# Patient Record
Sex: FEMALE | Race: WHITE | NOT HISPANIC OR LATINO | Employment: FULL TIME | URBAN - METROPOLITAN AREA
[De-identification: names, ages, dates, MRNs, and addresses within clinical notes are randomized per-mention and may not be internally consistent; named-entity substitution may affect disease eponyms.]

---

## 2019-09-07 ENCOUNTER — HOSPITAL ENCOUNTER (EMERGENCY)
Facility: HOSPITAL | Age: 29
Discharge: HOME | End: 2019-09-07
Attending: EMERGENCY MEDICINE
Payer: COMMERCIAL

## 2019-09-07 ENCOUNTER — APPOINTMENT (EMERGENCY)
Dept: ULTRASOUND IMAGING | Facility: HOSPITAL | Age: 29
End: 2019-09-07
Payer: COMMERCIAL

## 2019-09-07 VITALS
DIASTOLIC BLOOD PRESSURE: 65 MMHG | WEIGHT: 190 LBS | HEART RATE: 78 BPM | TEMPERATURE: 98.4 F | OXYGEN SATURATION: 98 % | BODY MASS INDEX: 31.65 KG/M2 | HEIGHT: 65 IN | SYSTOLIC BLOOD PRESSURE: 124 MMHG | RESPIRATION RATE: 18 BRPM

## 2019-09-07 DIAGNOSIS — O20.0 THREATENED MISCARRIAGE IN EARLY PREGNANCY: Primary | ICD-10-CM

## 2019-09-07 DIAGNOSIS — O20.9 VAGINAL BLEEDING AFFECTING EARLY PREGNANCY: ICD-10-CM

## 2019-09-07 LAB
ABO GROUP BLD: NORMAL
ALBUMIN SERPL BCP-MCNC: 4 G/DL (ref 3.5–5)
ALP SERPL-CCNC: 62 U/L (ref 46–116)
ALT SERPL W P-5'-P-CCNC: 16 U/L (ref 12–78)
ANION GAP SERPL CALCULATED.3IONS-SCNC: 9 MMOL/L (ref 4–13)
AST SERPL W P-5'-P-CCNC: 14 U/L (ref 5–45)
B-HCG SERPL-ACNC: 530 MIU/ML
BACTERIA UR QL AUTO: ABNORMAL /HPF
BASOPHILS # BLD AUTO: 0.05 THOUSANDS/ΜL (ref 0–0.1)
BASOPHILS NFR BLD AUTO: 1 % (ref 0–1)
BILIRUB SERPL-MCNC: 0.8 MG/DL (ref 0.2–1)
BILIRUB UR QL STRIP: NEGATIVE
BLD GP AB SCN SERPL QL: NEGATIVE
BUN SERPL-MCNC: 10 MG/DL (ref 5–25)
CALCIUM SERPL-MCNC: 9.1 MG/DL (ref 8.3–10.1)
CHLORIDE SERPL-SCNC: 105 MMOL/L (ref 100–108)
CLARITY UR: ABNORMAL
CO2 SERPL-SCNC: 28 MMOL/L (ref 21–32)
COLOR UR: ABNORMAL
CREAT SERPL-MCNC: 0.88 MG/DL (ref 0.6–1.3)
EOSINOPHIL # BLD AUTO: 0.33 THOUSAND/ΜL (ref 0–0.61)
EOSINOPHIL NFR BLD AUTO: 5 % (ref 0–6)
ERYTHROCYTE [DISTWIDTH] IN BLOOD BY AUTOMATED COUNT: 12.7 % (ref 11.6–15.1)
GFR SERPL CREATININE-BSD FRML MDRD: 88 ML/MIN/1.73SQ M
GLUCOSE SERPL-MCNC: 93 MG/DL (ref 65–140)
GLUCOSE UR STRIP-MCNC: NEGATIVE MG/DL
HCT VFR BLD AUTO: 41.8 % (ref 34.8–46.1)
HGB BLD-MCNC: 13.6 G/DL (ref 11.5–15.4)
HGB UR QL STRIP.AUTO: ABNORMAL
IMM GRANULOCYTES # BLD AUTO: 0.03 THOUSAND/UL (ref 0–0.2)
IMM GRANULOCYTES NFR BLD AUTO: 0 % (ref 0–2)
KETONES UR STRIP-MCNC: NEGATIVE MG/DL
LEUKOCYTE ESTERASE UR QL STRIP: ABNORMAL
LYMPHOCYTES # BLD AUTO: 2.78 THOUSANDS/ΜL (ref 0.6–4.47)
LYMPHOCYTES NFR BLD AUTO: 38 % (ref 14–44)
MCH RBC QN AUTO: 27.9 PG (ref 26.8–34.3)
MCHC RBC AUTO-ENTMCNC: 32.5 G/DL (ref 31.4–37.4)
MCV RBC AUTO: 86 FL (ref 82–98)
MONOCYTES # BLD AUTO: 0.33 THOUSAND/ΜL (ref 0.17–1.22)
MONOCYTES NFR BLD AUTO: 5 % (ref 4–12)
NEUTROPHILS # BLD AUTO: 3.81 THOUSANDS/ΜL (ref 1.85–7.62)
NEUTS SEG NFR BLD AUTO: 51 % (ref 43–75)
NITRITE UR QL STRIP: NEGATIVE
NON-SQ EPI CELLS URNS QL MICRO: ABNORMAL /HPF
NRBC BLD AUTO-RTO: 0 /100 WBCS
PH UR STRIP.AUTO: 6 [PH]
PLATELET # BLD AUTO: 288 THOUSANDS/UL (ref 149–390)
PMV BLD AUTO: 10.2 FL (ref 8.9–12.7)
POTASSIUM SERPL-SCNC: 3.8 MMOL/L (ref 3.5–5.3)
PROT SERPL-MCNC: 7.7 G/DL (ref 6.4–8.2)
PROT UR STRIP-MCNC: ABNORMAL MG/DL
RBC # BLD AUTO: 4.87 MILLION/UL (ref 3.81–5.12)
RBC #/AREA URNS AUTO: ABNORMAL /HPF
RH BLD: POSITIVE
SODIUM SERPL-SCNC: 142 MMOL/L (ref 136–145)
SP GR UR STRIP.AUTO: 1.02 (ref 1–1.03)
SPECIMEN EXPIRATION DATE: NORMAL
UROBILINOGEN UR QL STRIP.AUTO: 0.2 E.U./DL
WBC # BLD AUTO: 7.33 THOUSAND/UL (ref 4.31–10.16)
WBC #/AREA URNS AUTO: ABNORMAL /HPF

## 2019-09-07 PROCEDURE — 81001 URINALYSIS AUTO W/SCOPE: CPT | Performed by: PHYSICIAN ASSISTANT

## 2019-09-07 PROCEDURE — 76801 OB US < 14 WKS SINGLE FETUS: CPT

## 2019-09-07 PROCEDURE — 86900 BLOOD TYPING SEROLOGIC ABO: CPT | Performed by: PHYSICIAN ASSISTANT

## 2019-09-07 PROCEDURE — 87070 CULTURE OTHR SPECIMN AEROBIC: CPT | Performed by: PHYSICIAN ASSISTANT

## 2019-09-07 PROCEDURE — 87591 N.GONORRHOEAE DNA AMP PROB: CPT | Performed by: PHYSICIAN ASSISTANT

## 2019-09-07 PROCEDURE — 85025 COMPLETE CBC W/AUTO DIFF WBC: CPT | Performed by: PHYSICIAN ASSISTANT

## 2019-09-07 PROCEDURE — 96360 HYDRATION IV INFUSION INIT: CPT

## 2019-09-07 PROCEDURE — 99284 EMERGENCY DEPT VISIT MOD MDM: CPT | Performed by: PHYSICIAN ASSISTANT

## 2019-09-07 PROCEDURE — 87491 CHLMYD TRACH DNA AMP PROBE: CPT | Performed by: PHYSICIAN ASSISTANT

## 2019-09-07 PROCEDURE — 36415 COLL VENOUS BLD VENIPUNCTURE: CPT | Performed by: PHYSICIAN ASSISTANT

## 2019-09-07 PROCEDURE — 80053 COMPREHEN METABOLIC PANEL: CPT | Performed by: PHYSICIAN ASSISTANT

## 2019-09-07 PROCEDURE — 99284 EMERGENCY DEPT VISIT MOD MDM: CPT

## 2019-09-07 PROCEDURE — 84702 CHORIONIC GONADOTROPIN TEST: CPT | Performed by: PHYSICIAN ASSISTANT

## 2019-09-07 PROCEDURE — 87077 CULTURE AEROBIC IDENTIFY: CPT | Performed by: PHYSICIAN ASSISTANT

## 2019-09-07 PROCEDURE — 86901 BLOOD TYPING SEROLOGIC RH(D): CPT | Performed by: PHYSICIAN ASSISTANT

## 2019-09-07 PROCEDURE — 86850 RBC ANTIBODY SCREEN: CPT | Performed by: PHYSICIAN ASSISTANT

## 2019-09-07 RX ORDER — ALBUTEROL SULFATE 90 UG/1
2 AEROSOL, METERED RESPIRATORY (INHALATION) EVERY 6 HOURS PRN
COMMUNITY

## 2019-09-07 RX ORDER — HYDROXYZINE HYDROCHLORIDE 10 MG/1
10 TABLET, FILM COATED ORAL EVERY 6 HOURS PRN
COMMUNITY

## 2019-09-07 RX ADMIN — SODIUM CHLORIDE 1000 ML: 0.9 INJECTION, SOLUTION INTRAVENOUS at 18:51

## 2019-09-07 NOTE — ED PROVIDER NOTES
History  Chief Complaint   Patient presents with    Vaginal Bleeding     patient states being pregnant and trying to figure out how far along she is  spotting yesterday and bleeding more today  she states feeling cramping  Patient is a 27 y/o F  about 5 weeks pregnant that presents to the ED with vaginal bleeding and abdominal cramping that started 2 hours ago  Patient states she started with spotting yesterday, but the bleeding worsened today  She had a BhCG checked 2 days ago which was 413  She saw OB 4 days ago and was told to get serial HCGs  She thinks her blood type is O negative  History provided by:  Patient  Vaginal Bleeding   Quality:  Bright red and clots  Severity:  Moderate  Onset quality:  Sudden  Duration:  2 hours  Timing:  Constant  Progression:  Worsening  Chronicity:  New  Menstrual history:  Irregular  Possible pregnancy: yes    Context: spontaneously    Relieved by:  Nothing  Worsened by:  Nothing  Ineffective treatments:  None tried  Associated symptoms: abdominal pain    Associated symptoms: no back pain, no dizziness, no fever and no nausea    Risk factors: ovarian cysts    Risk factors: no prior miscarriage        Prior to Admission Medications   Prescriptions Last Dose Informant Patient Reported? Taking?    FLUoxetine (PROzac) 10 mg capsule  Self Yes No   Sig: Take 20 mg by mouth daily    albuterol (PROVENTIL HFA,VENTOLIN HFA) 90 mcg/act inhaler   Yes Yes   Sig: Inhale 2 puffs every 6 (six) hours as needed for wheezing   clonazePAM (KlonoPIN) 0 5 mg tablet Not Taking at Unknown time Self Yes No   Sig: Take 0 5 mg by mouth 2 (two) times a day as needed for seizures   hydrOXYzine HCL (ATARAX) 10 mg tablet   Yes Yes   Sig: Take 10 mg by mouth every 6 (six) hours as needed for itching      Facility-Administered Medications: None       Past Medical History:   Diagnosis Date    Anxiety     Ovarian cyst     left    Psychiatric disorder        Past Surgical History: Procedure Laterality Date    OVARIAN CYST REMOVAL         History reviewed  No pertinent family history  I have reviewed and agree with the history as documented  Social History     Tobacco Use    Smoking status: Never Smoker    Smokeless tobacco: Never Used   Substance Use Topics    Alcohol use: No    Drug use: Not on file        Review of Systems   Constitutional: Negative for chills and fever  HENT: Negative  Respiratory: Negative for cough and shortness of breath  Cardiovascular: Negative for chest pain and leg swelling  Gastrointestinal: Positive for abdominal pain  Negative for blood in stool, diarrhea, nausea and vomiting  Genitourinary: Positive for vaginal bleeding  Musculoskeletal: Negative for back pain  Skin: Negative for color change and rash  Neurological: Negative for dizziness, weakness and numbness  All other systems reviewed and are negative  Physical Exam  Physical Exam   Constitutional: She is oriented to person, place, and time  She appears well-developed and well-nourished  She is cooperative  She does not appear ill  No distress  HENT:   Head: Normocephalic and atraumatic  Nose: Nose normal    Mouth/Throat: Oropharynx is clear and moist    Eyes: Conjunctivae are normal    Neck: Normal range of motion  Cardiovascular: Normal rate, regular rhythm and normal heart sounds  Pulmonary/Chest: Effort normal and breath sounds normal  She has no wheezes  She has no rhonchi  She has no rales  Abdominal: Soft  Normal appearance and bowel sounds are normal  There is tenderness in the suprapubic area  There is no rigidity, no rebound and no guarding  Genitourinary: Pelvic exam was performed with patient supine  There is no rash on the right labia  There is no rash on the left labia  Cervix exhibits no discharge  There is bleeding in the vagina  Genitourinary Comments: Cervical os closed, no tissue or clots in the vagina   Mild amount of bright red blood in vagina  Musculoskeletal: Normal range of motion  She exhibits no edema  Neurological: She is alert and oriented to person, place, and time  She has normal strength  No sensory deficit  Gait normal    Skin: Skin is warm and dry  No rash noted  She is not diaphoretic  No pallor  Psychiatric: She exhibits a depressed mood (patient tearful)  Nursing note and vitals reviewed        Vital Signs  ED Triage Vitals [09/07/19 1805]   Temperature Pulse Respirations Blood Pressure SpO2   98 4 °F (36 9 °C) 95 20 141/79 95 %      Temp Source Heart Rate Source Patient Position - Orthostatic VS BP Location FiO2 (%)   Temporal Monitor Sitting Left arm --      Pain Score       5           Vitals:    09/07/19 1845 09/07/19 1930 09/07/19 1945 09/07/19 2000   BP: 106/66 118/68 112/67 124/65   Pulse: 82 80 80 87   Patient Position - Orthostatic VS: Lying Lying Lying Lying         Visual Acuity      ED Medications  Medications   sodium chloride 0 9 % bolus 1,000 mL (0 mL Intravenous Stopped 9/7/19 1951)       Diagnostic Studies  Results Reviewed     Procedure Component Value Units Date/Time    Urine Microscopic [[id number]]  (Abnormal) Collected:  09/07/19 2018    Lab Status:  Final result Specimen:  Urine, Clean Catch Updated:  09/07/19 2107     RBC, UA 30-50 /hpf      WBC, UA 2-4 /hpf      Epithelial Cells Occasional /hpf      Bacteria, UA Occasional /hpf     UA w Reflex to Microscopic w Reflex to Culture [[id number]]  (Abnormal) Collected:  09/07/19 2018    Lab Status:  Final result Specimen:  Urine, Clean Catch Updated:  09/07/19 2053     Color, UA Red     Clarity, UA Cloudy     Specific Gravity, UA 1 025     pH, UA 6 0     Leukocytes, UA Trace     Nitrite, UA Negative     Protein, UA 30 (1+) mg/dl      Glucose, UA Negative mg/dl      Ketones, UA Negative mg/dl      Urobilinogen, UA 0 2 E U /dl      Bilirubin, UA Negative     Blood, UA Large    Quantitative hCG [[id number]]  (Abnormal) Collected:  09/07/19 1826    Lab Status: Final result Specimen:  Blood from Arm, Right Updated:  09/07/19 2033     HCG, Quant 530 mIU/mL     Narrative:        Expected Ranges:     Approximate               Approximate HCG  Gestation age          Concentration ( mIU/mL)  _____________          ______________________   Va Michi                      HCG values  0 2-1                       5-50  1-2                           2-3                         100-5000  3-4                         500-51566  4-5                         1000-43848  5-6                         [id number]  6-8                         [id number]  8-12                        [id number]      Comprehensive metabolic panel [[id number]] Collected:  09/07/19 1826    Lab Status:  Final result Specimen:  Blood from Arm, Right Updated:  09/07/19 2023     Sodium 142 mmol/L      Potassium 3 8 mmol/L      Chloride 105 mmol/L      CO2 28 mmol/L      ANION GAP 9 mmol/L      BUN 10 mg/dL      Creatinine 0 88 mg/dL      Glucose 93 mg/dL      Calcium 9 1 mg/dL      AST 14 U/L      ALT 16 U/L      Alkaline Phosphatase 62 U/L      Total Protein 7 7 g/dL      Albumin 4 0 g/dL      Total Bilirubin 0 80 mg/dL      eGFR 88 ml/min/1 73sq m     Narrative:       [place] guidelines for Chronic Kidney Disease (CKD):     Stage 1 with normal or high GFR (GFR > 90 mL/min/1 73 square meters)    Stage 2 Mild CKD (GFR = 60-89 mL/min/1 73 square meters)    Stage 3A Moderate CKD (GFR = 45-59 mL/min/1 73 square meters)    Stage 3B Moderate CKD (GFR = 30-44 mL/min/1 73 square meters)    Stage 4 Severe CKD (GFR = 15-29 mL/min/1 73 square meters)    Stage 5 End Stage CKD (GFR <15 mL/min/1 73 square meters)  Note: GFR calculation is accurate only with a steady state creatinine    CBC and differential [[id number]] Collected:  09/07/19 1826    Lab Status:  Final result Specimen:  Blood from Arm, Right Updated:  09/07/19 1855     WBC 7 33 Thousand/uL      RBC 4 87 Million/uL      Hemoglobin 13 6 g/dL      Hematocrit 41 8 %      MCV 86 fL      MCH 27 9 pg      MCHC 32 5 g/dL      RDW 12 7 %      MPV 10 2 fL      Platelets 512 Thousands/uL      nRBC 0 /100 WBCs      Neutrophils Relative 51 %      Immat GRANS % 0 %      Lymphocytes Relative 38 %      Monocytes Relative 5 %      Eosinophils Relative 5 %      Basophils Relative 1 %      Neutrophils Absolute 3 81 Thousands/µL      Immature Grans Absolute 0 03 Thousand/uL      Lymphocytes Absolute 2 78 Thousands/µL      Monocytes Absolute 0 33 Thousand/µL      Eosinophils Absolute 0 33 Thousand/µL      Basophils Absolute 0 05 Thousands/µL     Genital Comprehensive Culture [[id number]] Collected:  09/07/19 1836    Lab Status: In process Specimen:  Genital from Cervix Updated:  09/07/19 1851    Chlamydia/GC amplified DNA by PCR [[id number]] Collected:  09/07/19 1836    Lab Status: In process Specimen:  Genital from Cervix Updated:  09/07/19 1851                 US OB < 14 weeks single or first gestation level 1   Final Result by Maurice Louise MD (09/07 2111)      Probable single 4 mm intrauterine gestational sac and no evidence yet of viable embryo  No adnexal masses  Recommend follow-up with repeat pelvic sonogram in approximately 2 weeks and serial beta hCG levels  Workstation performed: [id number]                    Procedures  Procedures       ED Course                               MDM  Number of Diagnoses or Management Options  Threatened miscarriage in early pregnancy: new and requires workup  Vaginal bleeding affecting early pregnancy: new and requires workup  Diagnosis management comments: Patient with vaginal bleeding, will check labs, BHCG, RH, u/s to r/o ectopic  Patient O +, no need for rhogam   D/w patient this could be early IUP vs threatened miscarriage  REturn precautions given          Amount and/or Complexity of Data Reviewed  Clinical lab tests: ordered and reviewed  Tests in the radiology section of CPT®: ordered and reviewed    Patient Progress  Patient progress: stable      Disposition  Final diagnoses:   Threatened miscarriage in early pregnancy   Vaginal bleeding affecting early pregnancy     Time reflects when diagnosis was documented in both MDM as applicable and the Disposition within this note     Time User Action Codes Description Comment    9/7/2019  9:27 PM Michelle Hand Add [O20 0] Threatened miscarriage in early pregnancy     9/7/2019  9:27 PM Michelle Hand Add [O20 8] Vaginal bleeding affecting early pregnancy       ED Disposition     ED Disposition Condition Date/Time Comment    Discharge Stable Sat Sep 7, 2019  9:27 PM [place] Severe discharge to home/self care  Follow-up Information     Follow up With Specialties Details Why Contact Info    OB/GYN  Call in 2 days For recheck           Patient's Medications   Discharge Prescriptions    No medications on file     No discharge procedures on file      ED Provider  Electronically Signed by           Rusty Parmar PA-C  09/07/19 6401

## 2019-09-08 NOTE — DISCHARGE INSTRUCTIONS
Rest, tylenol as needed for cramping  Follow up with OB/GYN doctor in 2-3 days for recheck  Return to ER if bleeding worsens or pain worsens

## 2019-09-09 ENCOUNTER — TELEPHONE (OUTPATIENT)
Dept: EMERGENCY DEPT | Facility: HOSPITAL | Age: 29
End: 2019-09-09

## 2019-09-09 DIAGNOSIS — B96.89 BACTERIAL VAGINOSIS: Primary | ICD-10-CM

## 2019-09-09 DIAGNOSIS — N76.0 BACTERIAL VAGINOSIS: Primary | ICD-10-CM

## 2019-09-09 LAB
BACTERIA GENITAL AEROBE CULT: ABNORMAL
BACTERIA GENITAL AEROBE CULT: ABNORMAL
C TRACH DNA SPEC QL NAA+PROBE: NEGATIVE
N GONORRHOEA DNA SPEC QL NAA+PROBE: NEGATIVE

## 2019-09-09 RX ORDER — METRONIDAZOLE 500 MG/1
500 TABLET ORAL EVERY 12 HOURS SCHEDULED
Qty: 14 TABLET | Refills: 0
Start: 2019-09-09 | End: 2019-09-16

## 2022-02-10 ENCOUNTER — OFFICE VISIT (OUTPATIENT)
Dept: URGENT CARE | Facility: CLINIC | Age: 32
End: 2022-02-10
Payer: COMMERCIAL

## 2022-02-10 VITALS
HEART RATE: 118 BPM | BODY MASS INDEX: 39.32 KG/M2 | RESPIRATION RATE: 16 BRPM | WEIGHT: 236 LBS | HEIGHT: 65 IN | TEMPERATURE: 100.1 F | OXYGEN SATURATION: 99 %

## 2022-02-10 DIAGNOSIS — N30.00 ACUTE CYSTITIS WITHOUT HEMATURIA: Primary | ICD-10-CM

## 2022-02-10 DIAGNOSIS — R42 VERTIGO: ICD-10-CM

## 2022-02-10 DIAGNOSIS — T50.Z95A VACCINATION SIDE EFFECTS, INITIAL ENCOUNTER: ICD-10-CM

## 2022-02-10 LAB
SL AMB  POCT GLUCOSE, UA: NEGATIVE
SL AMB LEUKOCYTE ESTERASE,UA: ABNORMAL
SL AMB POCT BILIRUBIN,UA: NEGATIVE
SL AMB POCT BLOOD,UA: NEGATIVE
SL AMB POCT CLARITY,UA: ABNORMAL
SL AMB POCT COLOR,UA: YELLOW
SL AMB POCT KETONES,UA: NEGATIVE
SL AMB POCT NITRITE,UA: NEGATIVE
SL AMB POCT PH,UA: 6.5
SL AMB POCT SPECIFIC GRAVITY,UA: 1.01
SL AMB POCT URINE PROTEIN: NEGATIVE
SL AMB POCT UROBILINOGEN: 0.2

## 2022-02-10 PROCEDURE — G0382 LEV 3 HOSP TYPE B ED VISIT: HCPCS | Performed by: PHYSICIAN ASSISTANT

## 2022-02-10 PROCEDURE — 87086 URINE CULTURE/COLONY COUNT: CPT | Performed by: PHYSICIAN ASSISTANT

## 2022-02-10 RX ORDER — MECLIZINE HCL 12.5 MG/1
12.5 TABLET ORAL 3 TIMES DAILY PRN
Qty: 30 TABLET | Refills: 0 | Status: SHIPPED | OUTPATIENT
Start: 2022-02-10

## 2022-02-10 RX ORDER — CEPHALEXIN 500 MG/1
500 CAPSULE ORAL EVERY 12 HOURS SCHEDULED
Qty: 6 CAPSULE | Refills: 0 | Status: SHIPPED | OUTPATIENT
Start: 2022-02-10 | End: 2022-02-13

## 2022-02-10 RX ORDER — LORAZEPAM 0.5 MG/1
TABLET ORAL
COMMUNITY
Start: 2021-12-07

## 2022-02-10 NOTE — PATIENT INSTRUCTIONS
Take antibiotic as prescribed  Complete full dose of antibiotics even if symptoms begin to improve or resolve  May use OTC Tylenol for fever  DRINK LOTS OF FLUIDS  Observe for signs of worsening infection including increased pain, discharge, blood in the urine, back or flank pain, fever or chills, or persistent symptoms  Your symptoms should begin to improve over the next couple days  Follow-up with your PCP in 3-5 days if symptoms worsen or do not improve  Go to ER if symptoms become severe

## 2022-02-10 NOTE — PROGRESS NOTES
[facility]        NAME: Sabas Branham is a 27 y o  female  : 1990    MRN: [id number]  DATE: February 10, 2022  TIME: 12:41 PM    Assessment and Plan   Acute cystitis without hematuria [N30 00]  1  Acute cystitis without hematuria  POCT urine dip auto non-scope    Urine culture    cephalexin (KEFLEX) 500 mg capsule   2  Vertigo  meclizine (ANTIVERT) 12 5 MG tablet   3  Vaccination side effects, initial encounter       Patient febrile with back pain/flank pain  No other UTI symptoms  Urine dip shows small leuks  Not totally convincing of UTI but will treat based on flank pain and fever with leuks and urine  Will treat with antibiotics  Urine culture sent to confirm  Suspect symptoms are related to vaccination as it is still within 24 hours since vaccine  Discussed possibly that patient may have COVID-19 or flu  She did a rapid at home which was negative  Discussed red flag symptoms with patient to seek evaluation emergency room if they occur  Advised to call Dr  Today to make appointment within to 3 days for follow-up specially should not improving  Patient verbalized understanding  Patient Instructions       Follow up with PCP in 3-5 days  Proceed to  ER if symptoms worsen  Chief Complaint     Chief Complaint   Patient presents with    Dizziness     Patient reports dizziness and radiating back pain that began last night  She received a Covid 19 booster yesterday afternoon   Back Pain         History of Present Illness       Patient is a 76-year-old female with significant past medical history of ovarian cyst and anxiety presents the office complaining of dizziness and back pain since last night  Dizziness occurs only with head movements or change in positions and resolves when she sits still  States she did feel very unbalanced earlier which caused 2 to fall to the ground but did not strike her head and denies loss of consciousness    Reports she had a fever last night but resolved this morning  She is also complaining of midback pain described as 7/10 which radiates around her flanks to her lower abdomen bilateral   She denies headaches, change in vision, congestion, rhinorrhea, sore throat, cough, chest pain, shortness of breath, numbness and tingling, dysuria, UTI symptoms, abdominal pain, or change in bowel movements  States she has been drinking plenty of fluids  Reports she got her COVID-19 booster (moderna) yesterday  Review of Systems   Review of Systems   Constitutional: Positive for chills and fever  HENT: Negative for congestion and sore throat  Eyes: Negative for photophobia and visual disturbance  Respiratory: Negative for cough and shortness of breath  Cardiovascular: Negative for chest pain and palpitations  Gastrointestinal: Negative for abdominal pain, diarrhea, nausea and vomiting  Genitourinary: Positive for flank pain  Negative for dysuria, frequency, hematuria, urgency, vaginal bleeding, vaginal discharge and vaginal pain  Musculoskeletal: Positive for myalgias  Neurological: Positive for dizziness  Negative for syncope, light-headedness and headaches  Psychiatric/Behavioral: Negative for confusion  Current Medications       Current Outpatient Medications:     albuterol (PROVENTIL HFA,VENTOLIN HFA) 90 mcg/act inhaler, Inhale 2 puffs every 6 (six) hours as needed for wheezing, Disp: , Rfl:     FLUoxetine (PROzac) 10 mg capsule, Take 20 mg by mouth daily , Disp: , Rfl:     LORazepam (ATIVAN) 0 5 mg tablet, Take half (0 25 mg) to one (0 5 mg) daily only if needed for anxiety, max dose of 0 5 mg daily  , Disp: , Rfl:     cephalexin (KEFLEX) 500 mg capsule, Take 1 capsule (500 mg total) by mouth every 12 (twelve) hours for 3 days, Disp: 6 capsule, Rfl: 0    clonazePAM (KlonoPIN) 0 5 mg tablet, Take 0 5 mg by mouth 2 (two) times a day as needed for seizures, Disp: , Rfl:     hydrOXYzine HCL (ATARAX) 10 mg tablet, Take 10 mg by mouth every 6 (six) hours as needed for itching, Disp: , Rfl:     meclizine (ANTIVERT) 12 5 MG tablet, Take 1 tablet (12 5 mg total) by mouth 3 (three) times a day as needed for dizziness, Disp: 30 tablet, Rfl: 0    Current Allergies     Allergies as of 02/10/2022 - Reviewed 02/10/2022   Allergen Reaction Noted    Bactrim [sulfamethoxazole-trimethoprim]  12/09/2016            The following portions of the patient's history were reviewed and updated as appropriate: allergies, current medications, past family history, past medical history, past social history, past surgical history and problem list      Past Medical History:   Diagnosis Date    Anxiety     Ovarian cyst     left    Psychiatric disorder        Past Surgical History:   Procedure Laterality Date    OVARIAN CYST REMOVAL         History reviewed  No pertinent family history  Medications have been verified  Objective   Pulse (!) 118   Temp 100 1 °F (37 8 °C)   Resp 16   Ht 5' 5" (1 651 m)   Wt 107 kg (236 lb)   SpO2 99%   BMI 39 27 kg/m²   No LMP recorded  Physical Exam     Physical Exam  Vitals and nursing note reviewed  Constitutional:       General: She is not in acute distress  Appearance: Normal appearance  She is well-developed  She is ill-appearing  She is not toxic-appearing  HENT:      Head: Normocephalic and atraumatic  Right Ear: Tympanic membrane, ear canal and external ear normal       Left Ear: Tympanic membrane, ear canal and external ear normal       Nose: Nose normal       Mouth/Throat:      Pharynx: Uvula midline  Eyes:      General: Lids are normal  Vision grossly intact  Extraocular Movements: Extraocular movements intact  Conjunctiva/sclera: Conjunctivae normal       Pupils: Pupils are equal, round, and reactive to light  Cardiovascular:      Rate and Rhythm: Regular rhythm  Tachycardia present  Pulses: Normal pulses  Heart sounds: Normal heart sounds   No murmur heard   No friction rub  No gallop  Pulmonary:      Effort: Pulmonary effort is normal       Breath sounds: Normal breath sounds  No wheezing, rhonchi or rales  Abdominal:      General: Bowel sounds are normal       Palpations: Abdomen is soft  Tenderness: There is no abdominal tenderness  There is right CVA tenderness and left CVA tenderness  There is no guarding  Negative signs include Aburto's sign  Musculoskeletal:         General: Normal range of motion  Cervical back: Neck supple  Lymphadenopathy:      Cervical: No cervical adenopathy  Skin:     General: Skin is warm and dry  Capillary Refill: Capillary refill takes less than 2 seconds  Neurological:      General: No focal deficit present  Mental Status: She is alert  GCS: GCS eye subscore is 4  GCS verbal subscore is 5  GCS motor subscore is 6  Cranial Nerves: Cranial nerves are intact  Sensory: Sensation is intact  Motor: Motor function is intact  Coordination: Coordination is intact  Gait: Gait is intact  Psychiatric:         Mood and Affect: Mood is anxious         Urine dip:    COLOR,UA yellow    CLARITY,UA hazy    SPECIFIC GRAVITY,UA 1 015     PH,UA 6 5    LEUKOCYTE ESTERASE,UA small    NITRITE,UA negative    GLUCOSE, UA negative    KETONES,UA negative    BILIRUBIN,UA negative    BLOOD,UA negative    POCT URINE PROTEIN negative    SL AMB POCT UROBILINOGEN 0 2

## 2022-02-12 LAB — BACTERIA UR CULT: NORMAL

## 2024-03-28 ENCOUNTER — OCCMED (OUTPATIENT)
Dept: URGENT CARE | Facility: CLINIC | Age: 34
End: 2024-03-28

## 2024-03-28 DIAGNOSIS — Z02.1 PRE-EMPLOYMENT HEALTH SCREENING EXAMINATION: Primary | ICD-10-CM

## 2024-03-28 LAB — RUBV IGG SERPL IA-ACNC: 32.6 IU/ML

## 2024-03-28 PROCEDURE — 86480 TB TEST CELL IMMUN MEASURE: CPT

## 2024-03-28 PROCEDURE — 86787 VARICELLA-ZOSTER ANTIBODY: CPT

## 2024-03-28 PROCEDURE — 86762 RUBELLA ANTIBODY: CPT

## 2024-03-28 PROCEDURE — 86765 RUBEOLA ANTIBODY: CPT

## 2024-03-28 PROCEDURE — 86735 MUMPS ANTIBODY: CPT

## 2024-03-29 LAB
GAMMA INTERFERON BACKGROUND BLD IA-ACNC: 0.07 IU/ML
M TB IFN-G BLD-IMP: NEGATIVE
M TB IFN-G CD4+ BCKGRND COR BLD-ACNC: 0.01 IU/ML
M TB IFN-G CD4+ BCKGRND COR BLD-ACNC: 0.01 IU/ML
MEV IGG SER QL IA: NORMAL
MITOGEN IGNF BCKGRD COR BLD-ACNC: 7.72 IU/ML
MUV IGG SER QL IA: NORMAL
VZV IGG SER QL IA: NORMAL

## 2024-04-29 ENCOUNTER — HOSPITAL ENCOUNTER (EMERGENCY)
Facility: HOSPITAL | Age: 34
End: 2024-04-30
Attending: EMERGENCY MEDICINE
Payer: COMMERCIAL

## 2024-04-29 DIAGNOSIS — F32.A DEPRESSION: ICD-10-CM

## 2024-04-29 DIAGNOSIS — F41.9 ANXIETY: ICD-10-CM

## 2024-04-29 DIAGNOSIS — R45.851 SUICIDAL IDEATIONS: Primary | ICD-10-CM

## 2024-04-29 PROBLEM — Z00.8 ENCOUNTER FOR PSYCHOLOGICAL EVALUATION: Status: ACTIVE | Noted: 2024-04-29

## 2024-04-29 LAB
AMPHETAMINES SERPL QL SCN: NEGATIVE
BARBITURATES UR QL: NEGATIVE
BENZODIAZ UR QL: NEGATIVE
COCAINE UR QL: NEGATIVE
ETHANOL EXG-MCNC: 0 MG/DL
EXT PREGNANCY TEST URINE: NEGATIVE
EXT. CONTROL: NORMAL
FENTANYL UR QL SCN: NEGATIVE
HYDROCODONE UR QL SCN: NEGATIVE
METHADONE UR QL: NEGATIVE
OPIATES UR QL SCN: NEGATIVE
OXYCODONE+OXYMORPHONE UR QL SCN: NEGATIVE
PCP UR QL: NEGATIVE
THC UR QL: NEGATIVE

## 2024-04-29 PROCEDURE — 82075 ASSAY OF BREATH ETHANOL: CPT

## 2024-04-29 PROCEDURE — 81025 URINE PREGNANCY TEST: CPT | Performed by: EMERGENCY MEDICINE

## 2024-04-29 PROCEDURE — 80307 DRUG TEST PRSMV CHEM ANLYZR: CPT | Performed by: EMERGENCY MEDICINE

## 2024-04-29 PROCEDURE — 99285 EMERGENCY DEPT VISIT HI MDM: CPT

## 2024-04-29 PROCEDURE — 99285 EMERGENCY DEPT VISIT HI MDM: CPT | Performed by: EMERGENCY MEDICINE

## 2024-04-29 RX ORDER — BISACODYL 10 MG
10 SUPPOSITORY, RECTAL RECTAL DAILY PRN
Status: CANCELLED | OUTPATIENT
Start: 2024-04-29

## 2024-04-29 RX ORDER — LURASIDONE HYDROCHLORIDE 80 MG/1
40 TABLET, FILM COATED ORAL
COMMUNITY
End: 2024-05-07

## 2024-04-29 RX ORDER — HALOPERIDOL 10 MG/1
5 TABLET ORAL
Status: CANCELLED | OUTPATIENT
Start: 2024-04-29

## 2024-04-29 RX ORDER — ACETAMINOPHEN 325 MG/1
650 TABLET ORAL EVERY 4 HOURS PRN
Status: CANCELLED | OUTPATIENT
Start: 2024-04-29

## 2024-04-29 RX ORDER — MAGNESIUM HYDROXIDE/ALUMINUM HYDROXICE/SIMETHICONE 120; 1200; 1200 MG/30ML; MG/30ML; MG/30ML
30 SUSPENSION ORAL EVERY 4 HOURS PRN
Status: CANCELLED | OUTPATIENT
Start: 2024-04-29

## 2024-04-29 RX ORDER — AMOXICILLIN 250 MG
1 CAPSULE ORAL DAILY PRN
Status: CANCELLED | OUTPATIENT
Start: 2024-04-29

## 2024-04-29 RX ORDER — BUSPIRONE HYDROCHLORIDE 10 MG/1
10 TABLET ORAL 2 TIMES DAILY
Status: DISCONTINUED | OUTPATIENT
Start: 2024-04-30 | End: 2024-04-30 | Stop reason: HOSPADM

## 2024-04-29 RX ORDER — ACETAMINOPHEN 325 MG/1
975 TABLET ORAL EVERY 6 HOURS PRN
Status: CANCELLED | OUTPATIENT
Start: 2024-04-29

## 2024-04-29 RX ORDER — LORAZEPAM 2 MG/ML
2 INJECTION INTRAMUSCULAR EVERY 6 HOURS PRN
Status: CANCELLED | OUTPATIENT
Start: 2024-04-29

## 2024-04-29 RX ORDER — HYDROXYZINE HYDROCHLORIDE 25 MG/1
100 TABLET, FILM COATED ORAL
Status: CANCELLED | OUTPATIENT
Start: 2024-04-29

## 2024-04-29 RX ORDER — BENZTROPINE MESYLATE 1 MG/1
1 TABLET ORAL
Status: CANCELLED | OUTPATIENT
Start: 2024-04-29

## 2024-04-29 RX ORDER — LURASIDONE HYDROCHLORIDE 40 MG/1
40 TABLET, FILM COATED ORAL
Status: DISCONTINUED | OUTPATIENT
Start: 2024-04-30 | End: 2024-04-30 | Stop reason: HOSPADM

## 2024-04-29 RX ORDER — BENZTROPINE MESYLATE 1 MG/ML
0.5 INJECTION INTRAMUSCULAR; INTRAVENOUS
Status: CANCELLED | OUTPATIENT
Start: 2024-04-29

## 2024-04-29 RX ORDER — HYDROXYZINE HYDROCHLORIDE 25 MG/1
25 TABLET, FILM COATED ORAL
Status: CANCELLED | OUTPATIENT
Start: 2024-04-29

## 2024-04-29 RX ORDER — BENZTROPINE MESYLATE 1 MG/ML
1 INJECTION INTRAMUSCULAR; INTRAVENOUS
Status: CANCELLED | OUTPATIENT
Start: 2024-04-29

## 2024-04-29 RX ORDER — HYDROXYZINE HYDROCHLORIDE 25 MG/1
50 TABLET, FILM COATED ORAL
Status: CANCELLED | OUTPATIENT
Start: 2024-04-29

## 2024-04-29 RX ORDER — HALOPERIDOL 5 MG/ML
5 INJECTION INTRAMUSCULAR
Status: CANCELLED | OUTPATIENT
Start: 2024-04-29

## 2024-04-29 RX ORDER — BUSPIRONE HYDROCHLORIDE 10 MG/1
10 TABLET ORAL 2 TIMES DAILY
COMMUNITY
Start: 2024-04-11 | End: 2024-05-07

## 2024-04-29 RX ORDER — LANOLIN ALCOHOL/MO/W.PET/CERES
3 CREAM (GRAM) TOPICAL
Status: CANCELLED | OUTPATIENT
Start: 2024-04-29

## 2024-04-29 RX ORDER — TRAZODONE HYDROCHLORIDE 50 MG/1
50 TABLET ORAL
Status: CANCELLED | OUTPATIENT
Start: 2024-04-29

## 2024-04-29 RX ORDER — HALOPERIDOL 5 MG/ML
2.5 INJECTION INTRAMUSCULAR
Status: CANCELLED | OUTPATIENT
Start: 2024-04-29

## 2024-04-29 RX ORDER — TRAZODONE HYDROCHLORIDE 50 MG/1
50 TABLET ORAL
Status: DISCONTINUED | OUTPATIENT
Start: 2024-04-29 | End: 2024-04-30 | Stop reason: HOSPADM

## 2024-04-29 RX ORDER — LORAZEPAM 1 MG/1
1 TABLET ORAL EVERY 6 HOURS PRN
Status: DISCONTINUED | OUTPATIENT
Start: 2024-04-29 | End: 2024-04-30 | Stop reason: HOSPADM

## 2024-04-29 RX ORDER — TRAZODONE HYDROCHLORIDE 50 MG/1
50 TABLET ORAL
Status: ON HOLD | COMMUNITY
Start: 2024-04-11 | End: 2024-05-07

## 2024-04-29 RX ORDER — LORAZEPAM 2 MG/ML
2 INJECTION INTRAMUSCULAR
Status: CANCELLED | OUTPATIENT
Start: 2024-04-29

## 2024-04-29 RX ORDER — LORAZEPAM 2 MG/ML
1 INJECTION INTRAMUSCULAR
Status: CANCELLED | OUTPATIENT
Start: 2024-04-29

## 2024-04-29 RX ORDER — POLYETHYLENE GLYCOL 3350 17 G/17G
17 POWDER, FOR SOLUTION ORAL DAILY PRN
Status: CANCELLED | OUTPATIENT
Start: 2024-04-29

## 2024-04-29 RX ORDER — ACETAMINOPHEN 325 MG/1
650 TABLET ORAL EVERY 6 HOURS PRN
Status: CANCELLED | OUTPATIENT
Start: 2024-04-29

## 2024-04-29 RX ORDER — HALOPERIDOL 2 MG/1
1 TABLET ORAL EVERY 6 HOURS PRN
Status: CANCELLED | OUTPATIENT
Start: 2024-04-29

## 2024-04-29 RX ORDER — DIPHENHYDRAMINE HYDROCHLORIDE 50 MG/ML
50 INJECTION INTRAMUSCULAR; INTRAVENOUS EVERY 6 HOURS PRN
Status: CANCELLED | OUTPATIENT
Start: 2024-04-29

## 2024-04-29 RX ADMIN — LORAZEPAM 1 MG: 1 TABLET ORAL at 22:05

## 2024-04-29 NOTE — ED PROVIDER NOTES
"History  Chief Complaint   Patient presents with    Psychiatric Evaluation     Pt to ED for mental health eval. + SI, \"wanted to end it\" but would not actually act on her plan. States she thought about standing in tub with hair dryer or cutting her wrist.      History from patient and medical records and , past medical history anxiety presents with concern for exacerbation today.  Patient states that she has had anxiety for few years she is on antidepressant she frequently has suicidal ideation.  Today she actually had a new thought of cutting her wrist she had a knife that she would use.  Earlier today at work she felt anxious and like a panic attack.  She was driving and did not feel safe driving she called out sick and went home but told her  that it was a panic attack.  She did take Ativan which she has been taking more frequently it has helped.  She is agreeable to inpatient psychiatric care at this point.        Prior to Admission Medications   Prescriptions Last Dose Informant Patient Reported? Taking?   LORazepam (ATIVAN) 0.5 mg tablet   Yes No   Sig: Take half (0.25 mg) to one (0.5 mg) daily only if needed for anxiety, max dose of 0.5 mg daily.   busPIRone (BUSPAR) 10 mg tablet   Yes Yes   Sig: Take 10 mg by mouth 2 (two) times a day   lurasidone (LATUDA) 80 mg tablet   Yes No   Sig: Take 40 mg by mouth daily with dinner   traZODone (DESYREL) 50 mg tablet   Yes Yes   Sig: Take 50 mg by mouth daily at bedtime      Facility-Administered Medications: None       Past Medical History:   Diagnosis Date    Anxiety     Ovarian cyst     left    Psychiatric disorder        Past Surgical History:   Procedure Laterality Date    OVARIAN CYST REMOVAL         History reviewed. No pertinent family history.  I have reviewed and agree with the history as documented.    E-Cigarette/Vaping     E-Cigarette/Vaping Substances     Social History     Tobacco Use    Smoking status: Never    Smokeless tobacco: Never "   Substance Use Topics    Alcohol use: No       Review of Systems   Constitutional:  Negative for chills and fever.   HENT:  Negative for ear pain and sore throat.    Eyes:  Negative for pain and visual disturbance.   Respiratory:  Negative for cough and shortness of breath.    Cardiovascular:  Negative for chest pain and palpitations.   Gastrointestinal:  Negative for abdominal pain and vomiting.   Genitourinary:  Negative for dysuria and hematuria.   Musculoskeletal:  Negative for arthralgias and back pain.   Skin:  Negative for color change and rash.   Neurological:  Negative for seizures and syncope.   Psychiatric/Behavioral:  Positive for suicidal ideas. The patient is nervous/anxious.    All other systems reviewed and are negative.      Physical Exam  Physical Exam  Vitals and nursing note reviewed.   Constitutional:       General: She is not in acute distress.     Appearance: She is well-developed.   HENT:      Head: Normocephalic and atraumatic.   Eyes:      Conjunctiva/sclera: Conjunctivae normal.   Cardiovascular:      Rate and Rhythm: Normal rate and regular rhythm.      Heart sounds: No murmur heard.  Pulmonary:      Effort: Pulmonary effort is normal. No respiratory distress.      Breath sounds: Normal breath sounds.   Abdominal:      Palpations: Abdomen is soft.      Tenderness: There is no abdominal tenderness.   Musculoskeletal:         General: No swelling.      Cervical back: Neck supple.   Skin:     General: Skin is warm and dry.      Capillary Refill: Capillary refill takes less than 2 seconds.   Neurological:      Mental Status: She is alert and oriented to person, place, and time.   Psychiatric:         Attention and Perception: Attention normal.         Mood and Affect: Mood is anxious and depressed. Affect is tearful.         Speech: Speech normal.         Behavior: Behavior is cooperative.         Thought Content: Thought content includes suicidal ideation. Thought content includes suicidal  plan.         Cognition and Memory: Cognition normal.         Judgment: Judgment normal.         Vital Signs  ED Triage Vitals [04/29/24 1831]   Temperature Pulse Respirations Blood Pressure SpO2   98.4 °F (36.9 °C) 98 18 (!) 150/107 99 %      Temp src Heart Rate Source Patient Position - Orthostatic VS BP Location FiO2 (%)   -- Monitor Sitting Left arm --      Pain Score       No Pain           Vitals:    04/29/24 1831 04/29/24 2113   BP: (!) 150/107 138/85   Pulse: 98 83   Patient Position - Orthostatic VS: Sitting Sitting         Visual Acuity      ED Medications  Medications   LORazepam (ATIVAN) tablet 1 mg (1 mg Oral Given 4/29/24 2205)   busPIRone (BUSPAR) tablet 10 mg (has no administration in time range)   lurasidone (LATUDA) tablet 40 mg (has no administration in time range)   traZODone (DESYREL) tablet 50 mg (has no administration in time range)       Diagnostic Studies  Results Reviewed       Procedure Component Value Units Date/Time    POCT pregnancy, urine [[id number]]  (Normal) Resulted: 04/29/24 2101    Lab Status: Final result Specimen: Urine Updated: 04/29/24 2101     EXT Preg Test, Ur Negative     Control Valid    Rapid drug screen, urine [[id number]]  (Normal) Collected: 04/29/24 2029    Lab Status: Final result Specimen: Urine, Clean Catch Updated: 04/29/24 2045     Amph/Meth UR Negative     Barbiturate Ur Negative     Benzodiazepine Urine Negative     Cocaine Urine Negative     Methadone Urine Negative     Opiate Urine Negative     PCP Ur Negative     THC Urine Negative     Oxycodone Urine Negative     Fentanyl Urine Negative     HYDROCODONE URINE Negative    Narrative:      FOR MEDICAL PURPOSES ONLY.   IF CONFIRMATION NEEDED PLEASE CONTACT THE LAB WITHIN 5 DAYS.    Drug Screen Cutoff Levels:  AMPHETAMINE/METHAMPHETAMINES  1000 ng/mL  BARBITURATES     200 ng/mL  BENZODIAZEPINES     200 ng/mL  COCAINE      300 ng/mL  METHADONE      300 ng/mL  OPIATES      300 ng/mL  PHENCYCLIDINE     25  ng/mL  THC       50 ng/mL  OXYCODONE      100 ng/mL  FENTANYL      5 ng/mL  HYDROCODONE     300 ng/mL    POCT alcohol breath test [[id number]]  (Normal) Resulted: 04/29/24 1843    Lab Status: Final result Updated: 04/29/24 1844     EXTBreath Alcohol 0.00                   No orders to display              Procedures  Procedures         ED Course  ED Course as of 04/30/24 0051   Tue Apr 30, 2024   0028 S/o to REGINA Lugo SI with plan, 201, [place] tomorrow, forms completed                               SBIRT 22yo+      Flowsheet Row Most Recent Value   Initial Alcohol Screen: US AUDIT-C     1. How often do you have a drink containing alcohol? 0 Filed at: 04/29/2024 1832   2. How many drinks containing alcohol do you have on a typical day you are drinking?  0 Filed at: 04/29/2024 1832   3a. Male UNDER 65: How often do you have five or more drinks on one occasion? 0 Filed at: 04/29/2024 1832   3b. FEMALE Any Age, or MALE 65+: How often do you have 4 or more drinks on one occassion? 0 Filed at: 04/29/2024 1832   Audit-C Score 0 Filed at: 04/29/2024 1832   KATY: How many times in the past year have you...    Used an illegal drug or used a prescription medication for non-medical reasons? Never Filed at: 04/29/2024 1832                      Medical Decision Making  Differential includes exacerbation of chronic depression, bipolar, posttraumatic stress, acute suicidal ideation with plan of cutting herself does not feel safe at home or on her own.  Signed a 201 medically cleared    Amount and/or Complexity of Data Reviewed  Labs: ordered.    Risk  Prescription drug management.  Decision regarding hospitalization.             Disposition  Final diagnoses:   Suicidal ideations   Depression   Anxiety     Time reflects when diagnosis was documented in both MDM as applicable and the Disposition within this note       Time User Action Codes Description Comment    4/29/2024  7:40 PM Basim Bautista Add [R45.851] Suicidal ideations      4/29/2024  7:40 PM Basim Bautista [F32.A] Depression     4/29/2024  7:40 PM Basim Bautista [F41.9] Anxiety           ED Disposition       ED Disposition   Transfer to Behavioral Health    Condition   --    Date/Time   Mon Apr 29, 2024 1910    Comment   Tiffany Valle should be transferred out and has been medically cleared.               MD Documentation      Flowsheet Row Most Recent Value   Patient Condition The patient has been stabilized such that within reasonable medical probability, no material deterioration of the patient condition or the condition of the unborn child(ever) is likely to result from the transfer   Reason for Transfer Level of Care needed not available at this facility  [inpatient behavioral health]   Risks of Transfer Potential for delay in receiving treatment, Potential deterioration of medical condition, Increased discomfort during transfer, Possible worsening of condition or death during transfer   Accepting Physician Dr Hall   Accepting Facility Name, [address] [place]    (Name & Tel number) [facility]   Sending MD Dr Basim Bautista, DO   Provider Certification General risk, such as traffic hazards, adverse weather conditions, rough terrain or turbulence, possible failure of equipment (including vehicle or aircraft), or consequences of actions of persons outside the control of the transport personnel, Unanticipated needs of medical equipment and personnel during transport, Risk of worsening condition, The possibility of a transport vehicle being unavailable, Consent was not obtained as patient is committed to psychiatric facility and transfer is mandated, The patient is stable for psychiatric transfer because they are medically stable, and is protected from harming him/herself or others during transport          RN Documentation      Flowsheet Row Most Recent Value   Accepting Facility Name, [facility]  [place]    (Name & Tel number) [facility]          Follow-up Information    None         Patient's Medications   Discharge Prescriptions    No medications on file       No discharge procedures on file.    PDMP Review       None            ED Provider  Electronically Signed by             Basim Bautista DO  04/30/24 0051

## 2024-04-29 NOTE — ED NOTES
201 was signed by the patient and the Provider.  Rights and Restrictions where explained.  Pt would like to be referred to [facility] or [facility].
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
## 2024-04-30 ENCOUNTER — HOSPITAL ENCOUNTER (INPATIENT)
Facility: HOSPITAL | Age: 34
LOS: 7 days | Discharge: HOME | DRG: 885 | End: 2024-05-07
Attending: STUDENT IN AN ORGANIZED HEALTH CARE EDUCATION/TRAINING PROGRAM | Admitting: PSYCHIATRY & NEUROLOGY
Payer: COMMERCIAL

## 2024-04-30 VITALS
RESPIRATION RATE: 18 BRPM | HEIGHT: 65 IN | DIASTOLIC BLOOD PRESSURE: 82 MMHG | WEIGHT: 236 LBS | SYSTOLIC BLOOD PRESSURE: 134 MMHG | BODY MASS INDEX: 39.32 KG/M2 | OXYGEN SATURATION: 97 % | TEMPERATURE: 98.4 F | HEART RATE: 90 BPM

## 2024-04-30 DIAGNOSIS — E55.9 VITAMIN D DEFICIENCY: ICD-10-CM

## 2024-04-30 DIAGNOSIS — J45.990 MILD EXERCISE-INDUCED ASTHMA: ICD-10-CM

## 2024-04-30 DIAGNOSIS — E53.8 VITAMIN B12 DEFICIENCY: ICD-10-CM

## 2024-04-30 DIAGNOSIS — G47.09 OTHER INSOMNIA: ICD-10-CM

## 2024-04-30 DIAGNOSIS — R45.851 SUICIDAL IDEATIONS: ICD-10-CM

## 2024-04-30 DIAGNOSIS — F41.1 GAD (GENERALIZED ANXIETY DISORDER): Chronic | ICD-10-CM

## 2024-04-30 DIAGNOSIS — F31.81 BIPOLAR 2 DISORDER, MAJOR DEPRESSIVE EPISODE (HCC): Primary | Chronic | ICD-10-CM

## 2024-04-30 PROBLEM — E66.9 OBESITY (BMI 35.0-39.9 WITHOUT COMORBIDITY): Status: ACTIVE | Noted: 2024-04-30

## 2024-04-30 LAB
25(OH)D3 SERPL-MCNC: 19.9 NG/ML (ref 30–100)
BACTERIA UR QL AUTO: ABNORMAL /HPF
BASOPHILS # BLD AUTO: 0.06 THOUSANDS/ÂΜL (ref 0–0.1)
BASOPHILS NFR BLD AUTO: 1 % (ref 0–1)
BILIRUB UR QL STRIP: NEGATIVE
CHOLEST SERPL-MCNC: 178 MG/DL
CLARITY UR: CLEAR
COLOR UR: ABNORMAL
EOSINOPHIL # BLD AUTO: 0.46 THOUSAND/ÂΜL (ref 0–0.61)
EOSINOPHIL NFR BLD AUTO: 6 % (ref 0–6)
ERYTHROCYTE [DISTWIDTH] IN BLOOD BY AUTOMATED COUNT: 12.9 % (ref 11.6–15.1)
EST. AVERAGE GLUCOSE BLD GHB EST-MCNC: 105 MG/DL
FOLATE SERPL-MCNC: 9.3 NG/ML
GLUCOSE UR STRIP-MCNC: NEGATIVE MG/DL
HBA1C MFR BLD: 5.3 %
HCT VFR BLD AUTO: 43.6 % (ref 34.8–46.1)
HDLC SERPL-MCNC: 43 MG/DL
HGB BLD-MCNC: 13.8 G/DL (ref 11.5–15.4)
HGB UR QL STRIP.AUTO: 10
IMM GRANULOCYTES # BLD AUTO: 0.01 THOUSAND/UL (ref 0–0.2)
IMM GRANULOCYTES NFR BLD AUTO: 0 % (ref 0–2)
KETONES UR STRIP-MCNC: NEGATIVE MG/DL
LDLC SERPL CALC-MCNC: 105 MG/DL (ref 0–100)
LEUKOCYTE ESTERASE UR QL STRIP: 100
LYMPHOCYTES # BLD AUTO: 3.1 THOUSANDS/ÂΜL (ref 0.6–4.47)
LYMPHOCYTES NFR BLD AUTO: 40 % (ref 14–44)
MCH RBC QN AUTO: 27.9 PG (ref 26.8–34.3)
MCHC RBC AUTO-ENTMCNC: 31.7 G/DL (ref 31.4–37.4)
MCV RBC AUTO: 88 FL (ref 82–98)
MONOCYTES # BLD AUTO: 0.37 THOUSAND/ÂΜL (ref 0.17–1.22)
MONOCYTES NFR BLD AUTO: 5 % (ref 4–12)
NEUTROPHILS # BLD AUTO: 3.74 THOUSANDS/ÂΜL (ref 1.85–7.62)
NEUTS SEG NFR BLD AUTO: 48 % (ref 43–75)
NITRITE UR QL STRIP: NEGATIVE
NON-SQ EPI CELLS URNS QL MICRO: ABNORMAL /HPF
NONHDLC SERPL-MCNC: 135 MG/DL
NRBC BLD AUTO-RTO: 0 /100 WBCS
PH UR STRIP.AUTO: 5 [PH]
PLATELET # BLD AUTO: 316 THOUSANDS/UL (ref 149–390)
PMV BLD AUTO: 9.7 FL (ref 8.9–12.7)
PROT UR STRIP-MCNC: ABNORMAL MG/DL
RBC # BLD AUTO: 4.95 MILLION/UL (ref 3.81–5.12)
RBC #/AREA URNS AUTO: ABNORMAL /HPF
SP GR UR STRIP.AUTO: 1.02 (ref 1–1.04)
TRIGL SERPL-MCNC: 152 MG/DL
TSH SERPL DL<=0.05 MIU/L-ACNC: 1.18 UIU/ML (ref 0.45–4.5)
UROBILINOGEN UA: NEGATIVE MG/DL
VIT B12 SERPL-MCNC: 295 PG/ML (ref 180–914)
WBC # BLD AUTO: 7.74 THOUSAND/UL (ref 4.31–10.16)
WBC #/AREA URNS AUTO: ABNORMAL /HPF

## 2024-04-30 PROCEDURE — 84443 ASSAY THYROID STIM HORMONE: CPT | Performed by: PSYCHIATRY & NEUROLOGY

## 2024-04-30 PROCEDURE — 84703 CHORIONIC GONADOTROPIN ASSAY: CPT | Performed by: PSYCHIATRY & NEUROLOGY

## 2024-04-30 PROCEDURE — 82306 VITAMIN D 25 HYDROXY: CPT | Performed by: PSYCHIATRY & NEUROLOGY

## 2024-04-30 PROCEDURE — 81001 URINALYSIS AUTO W/SCOPE: CPT | Performed by: PSYCHIATRY & NEUROLOGY

## 2024-04-30 PROCEDURE — 80061 LIPID PANEL: CPT | Performed by: PSYCHIATRY & NEUROLOGY

## 2024-04-30 PROCEDURE — 83036 HEMOGLOBIN GLYCOSYLATED A1C: CPT | Performed by: NURSE PRACTITIONER

## 2024-04-30 PROCEDURE — 85025 COMPLETE CBC W/AUTO DIFF WBC: CPT | Performed by: PSYCHIATRY & NEUROLOGY

## 2024-04-30 PROCEDURE — 82746 ASSAY OF FOLIC ACID SERUM: CPT | Performed by: PSYCHIATRY & NEUROLOGY

## 2024-04-30 PROCEDURE — 99253 IP/OBS CNSLTJ NEW/EST LOW 45: CPT | Performed by: NURSE PRACTITIONER

## 2024-04-30 PROCEDURE — 82607 VITAMIN B-12: CPT | Performed by: PSYCHIATRY & NEUROLOGY

## 2024-04-30 PROCEDURE — 80053 COMPREHEN METABOLIC PANEL: CPT | Performed by: PSYCHIATRY & NEUROLOGY

## 2024-04-30 PROCEDURE — 93005 ELECTROCARDIOGRAM TRACING: CPT

## 2024-04-30 RX ORDER — HALOPERIDOL 5 MG/ML
2.5 INJECTION INTRAMUSCULAR
Status: DISCONTINUED | OUTPATIENT
Start: 2024-04-30 | End: 2024-05-07 | Stop reason: HOSPADM

## 2024-04-30 RX ORDER — HYDROXYZINE HYDROCHLORIDE 25 MG/1
25 TABLET, FILM COATED ORAL
Status: DISCONTINUED | OUTPATIENT
Start: 2024-04-30 | End: 2024-05-07 | Stop reason: HOSPADM

## 2024-04-30 RX ORDER — BENZTROPINE MESYLATE 1 MG/ML
0.5 INJECTION INTRAMUSCULAR; INTRAVENOUS
Status: DISCONTINUED | OUTPATIENT
Start: 2024-04-30 | End: 2024-05-07 | Stop reason: HOSPADM

## 2024-04-30 RX ORDER — LURASIDONE HYDROCHLORIDE 40 MG/1
40 TABLET, FILM COATED ORAL
Status: DISCONTINUED | OUTPATIENT
Start: 2024-04-30 | End: 2024-05-01

## 2024-04-30 RX ORDER — ACETAMINOPHEN 325 MG/1
650 TABLET ORAL EVERY 4 HOURS PRN
Status: DISCONTINUED | OUTPATIENT
Start: 2024-04-30 | End: 2024-05-07 | Stop reason: HOSPADM

## 2024-04-30 RX ORDER — TRAZODONE HYDROCHLORIDE 50 MG/1
50 TABLET ORAL
Status: DISCONTINUED | OUTPATIENT
Start: 2024-04-30 | End: 2024-05-07 | Stop reason: HOSPADM

## 2024-04-30 RX ORDER — LORAZEPAM 2 MG/ML
1 INJECTION INTRAMUSCULAR
Status: DISCONTINUED | OUTPATIENT
Start: 2024-04-30 | End: 2024-05-07 | Stop reason: HOSPADM

## 2024-04-30 RX ORDER — ACETAMINOPHEN 325 MG/1
650 TABLET ORAL EVERY 6 HOURS PRN
Status: DISCONTINUED | OUTPATIENT
Start: 2024-04-30 | End: 2024-05-07 | Stop reason: HOSPADM

## 2024-04-30 RX ORDER — MAGNESIUM HYDROXIDE/ALUMINUM HYDROXICE/SIMETHICONE 120; 1200; 1200 MG/30ML; MG/30ML; MG/30ML
30 SUSPENSION ORAL EVERY 4 HOURS PRN
Status: DISCONTINUED | OUTPATIENT
Start: 2024-04-30 | End: 2024-05-07 | Stop reason: HOSPADM

## 2024-04-30 RX ORDER — ALBUTEROL SULFATE 90 UG/1
2 AEROSOL, METERED RESPIRATORY (INHALATION) EVERY 4 HOURS PRN
Status: DISCONTINUED | OUTPATIENT
Start: 2024-04-30 | End: 2024-05-07 | Stop reason: HOSPADM

## 2024-04-30 RX ORDER — ACETAMINOPHEN 325 MG/1
975 TABLET ORAL EVERY 6 HOURS PRN
Status: DISCONTINUED | OUTPATIENT
Start: 2024-04-30 | End: 2024-05-07 | Stop reason: HOSPADM

## 2024-04-30 RX ORDER — HYDROXYZINE 50 MG/1
100 TABLET, FILM COATED ORAL
Status: DISCONTINUED | OUTPATIENT
Start: 2024-04-30 | End: 2024-05-07 | Stop reason: HOSPADM

## 2024-04-30 RX ORDER — BUSPIRONE HYDROCHLORIDE 10 MG/1
10 TABLET ORAL 2 TIMES DAILY
Status: DISCONTINUED | OUTPATIENT
Start: 2024-04-30 | End: 2024-05-01

## 2024-04-30 RX ORDER — BISACODYL 10 MG
10 SUPPOSITORY, RECTAL RECTAL DAILY PRN
Status: DISCONTINUED | OUTPATIENT
Start: 2024-04-30 | End: 2024-05-07 | Stop reason: HOSPADM

## 2024-04-30 RX ORDER — LANOLIN ALCOHOL/MO/W.PET/CERES
3 CREAM (GRAM) TOPICAL
Status: DISCONTINUED | OUTPATIENT
Start: 2024-04-30 | End: 2024-04-30

## 2024-04-30 RX ORDER — LORAZEPAM 2 MG/ML
2 INJECTION INTRAMUSCULAR EVERY 6 HOURS PRN
Status: DISCONTINUED | OUTPATIENT
Start: 2024-04-30 | End: 2024-05-07 | Stop reason: HOSPADM

## 2024-04-30 RX ORDER — BENZTROPINE MESYLATE 1 MG/ML
1 INJECTION INTRAMUSCULAR; INTRAVENOUS
Status: DISCONTINUED | OUTPATIENT
Start: 2024-04-30 | End: 2024-05-07 | Stop reason: HOSPADM

## 2024-04-30 RX ORDER — AMOXICILLIN 250 MG
1 CAPSULE ORAL DAILY PRN
Status: DISCONTINUED | OUTPATIENT
Start: 2024-04-30 | End: 2024-05-07 | Stop reason: HOSPADM

## 2024-04-30 RX ORDER — HALOPERIDOL 1 MG/1
1 TABLET ORAL EVERY 6 HOURS PRN
Status: DISCONTINUED | OUTPATIENT
Start: 2024-04-30 | End: 2024-05-07 | Stop reason: HOSPADM

## 2024-04-30 RX ORDER — POLYETHYLENE GLYCOL 3350 17 G/17G
17 POWDER, FOR SOLUTION ORAL DAILY PRN
Status: DISCONTINUED | OUTPATIENT
Start: 2024-04-30 | End: 2024-05-07 | Stop reason: HOSPADM

## 2024-04-30 RX ORDER — LORAZEPAM 2 MG/ML
2 INJECTION INTRAMUSCULAR
Status: DISCONTINUED | OUTPATIENT
Start: 2024-04-30 | End: 2024-05-07 | Stop reason: HOSPADM

## 2024-04-30 RX ORDER — LORATADINE 10 MG/1
10 TABLET ORAL DAILY PRN
Status: DISCONTINUED | OUTPATIENT
Start: 2024-04-30 | End: 2024-05-07 | Stop reason: HOSPADM

## 2024-04-30 RX ORDER — HALOPERIDOL 5 MG/1
5 TABLET ORAL
Status: DISCONTINUED | OUTPATIENT
Start: 2024-04-30 | End: 2024-05-07 | Stop reason: HOSPADM

## 2024-04-30 RX ORDER — DIPHENHYDRAMINE HYDROCHLORIDE 50 MG/ML
50 INJECTION INTRAMUSCULAR; INTRAVENOUS EVERY 6 HOURS PRN
Status: DISCONTINUED | OUTPATIENT
Start: 2024-04-30 | End: 2024-05-07 | Stop reason: HOSPADM

## 2024-04-30 RX ORDER — HYDROXYZINE 50 MG/1
50 TABLET, FILM COATED ORAL
Status: DISCONTINUED | OUTPATIENT
Start: 2024-04-30 | End: 2024-05-07 | Stop reason: HOSPADM

## 2024-04-30 RX ORDER — BENZTROPINE MESYLATE 1 MG/1
1 TABLET ORAL
Status: DISCONTINUED | OUTPATIENT
Start: 2024-04-30 | End: 2024-05-07 | Stop reason: HOSPADM

## 2024-04-30 RX ORDER — HALOPERIDOL 5 MG/ML
5 INJECTION INTRAMUSCULAR
Status: DISCONTINUED | OUTPATIENT
Start: 2024-04-30 | End: 2024-05-07 | Stop reason: HOSPADM

## 2024-04-30 RX ADMIN — BUSPIRONE HYDROCHLORIDE 10 MG: 10 TABLET ORAL at 08:00

## 2024-04-30 RX ADMIN — HYDROXYZINE HYDROCHLORIDE 25 MG: 25 TABLET, FILM COATED ORAL at 11:41

## 2024-04-30 RX ADMIN — TRAZODONE HYDROCHLORIDE 50 MG: 50 TABLET ORAL at 21:13

## 2024-04-30 RX ADMIN — HYDROXYZINE HYDROCHLORIDE 50 MG: 50 TABLET, FILM COATED ORAL at 17:56

## 2024-04-30 RX ADMIN — LORAZEPAM 1 MG: 1 TABLET ORAL at 07:58

## 2024-04-30 RX ADMIN — LURASIDONE HYDROCHLORIDE 40 MG: 40 TABLET ORAL at 15:52

## 2024-04-30 RX ADMIN — TRAZODONE HYDROCHLORIDE 50 MG: 50 TABLET ORAL at 02:26

## 2024-04-30 RX ADMIN — BUSPIRONE HYDROCHLORIDE 10 MG: 10 TABLET ORAL at 17:56
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
## 2024-04-30 NOTE — PLAN OF CARE
Problem: Ineffective Coping  Goal: Cooperates with admission process  Description: Interventions:   - Complete admission process  Outcome: Not Progressing  Goal: Identifies ineffective coping skills  Outcome: Not Progressing  Goal: Identifies healthy coping skills  Outcome: Not Progressing  Goal: Demonstrates healthy coping skills  Outcome: Not Progressing  Goal: Participates in unit activities  Description: Interventions:  - Provide therapeutic environment   - Provide required programming   - Redirect inappropriate behaviors   Outcome: Not Progressing  Goal: Patient/Family verbalizes awareness of resources  Outcome: Not Progressing  Goal: Understands least restrictive measures  Description: Interventions:  - Utilize least restrictive behavior  Outcome: Not Progressing  Goal: Free from restraint events  Description: - Utilize least restrictive measures   - Provide behavioral interventions   - Redirect inappropriate behaviors   Outcome: Not Progressing     Problem: Depression  Goal: Treatment Goal: Demonstrate behavioral control of depressive symptoms, verbalize feelings of improved mood/affect, and adopt new coping skills prior to discharge  Outcome: Not Progressing  Goal: Refrain from harming self  Description: Interventions:  - Monitor patient closely, per order   - Supervise medication ingestion, monitor effects and side effects   Outcome: Not Progressing  Goal: Refrain from isolation  Description: Interventions:  - Develop a trusting relationship   - Encourage socialization   Outcome: Not Progressing  Goal: Refrain from self-neglect  Outcome: Not Progressing  Goal: Attend and participate in unit activities, including therapeutic, recreational, and educational groups  Description: Interventions:  - Provide therapeutic and educational activities daily, encourage attendance and participation, and document same in the medical record   Outcome: Not Progressing     Problem: Anxiety  Goal: Anxiety is at manageable  level  Description: Interventions:  - Assess and monitor patient's anxiety level.   - Monitor for signs and symptoms (heart palpitations, chest pain, shortness of breath, headaches, nausea, feeling jumpy, restlessness, irritable, apprehensive).   - Collaborate with interdisciplinary team and initiate plan and interventions as ordered.  - [place] patient to unit/surroundings  - Explain treatment plan  - Encourage participation in care  - Encourage verbalization of concerns/fears  - Identify coping mechanisms  - Assist in developing anxiety-reducing skills  - Administer/offer alternative therapies  - Limit or eliminate stimulants  Outcome: Not Progressing

## 2024-04-30 NOTE — NURSING NOTE
25 mg Atarax PRN was effective in reducing anxiety symptoms on reassessment. Pt is no longer presenting with this concern at this time.

## 2024-04-30 NOTE — ED NOTES
Patient is accepted at [address]  Patient is accepted by Dr. Rafael Martínez.      Transportation is arranged with [facility].      Transportation is scheduled for 11:00 [employer]         Nurse report is to be called to [phone] prior to patient transfer.

## 2024-04-30 NOTE — CONSULTS
[facility]  Consult  Name: Tiffany Valle 33 y.o. female I MRN: [id number]  Unit/Bed#: -01 I Date of Admission: 4/30/2024   Date of Service: 4/30/2024 I Hospital Day: 0    Inpatient consult for Medical Clearance for  patient  Consult performed by: STACIE Tinsley  Consult ordered by: Sade Hall MD          Assessment/Plan   Obesity (BMI 35.0-39.9 without comorbidity)  Assessment & Plan  Patient's BMI 38.07  Lifestyle modifications    Medical clearance for psychiatric admission  Assessment & Plan  Vital signs stable afebrile patient seen and examined by myself all labs from today are pending  Patient medically stable for admit  SL IM will sign off please call with any questions or concerns         Counseling / Coordination of Care Time: 45 minutes.  Greater than 50% of total time spent on patient counseling and coordination of care.    Collaboration of Care: Were Recommendations Directly Discussed with Primary Treatment Team? - No     History of Present Illness:    Tiffany Valle is a 33 y.o. female who is originally admitted to the psychiatry service due to depression, anxiety, as well as suicidal ideation. We are consulted for medical clearance for admission to Behavioral Health Unit and treatment of underlying psychiatric illness.      Patient presents to [facility] 4/29/2024 with increasing anxiety, depression, panic attacks.  Patient has suicidal ideation with a plan of feeling the top up with water and electrocuting herself with a hair dryer, or cutting her wrists.  Patient has had no prior psychiatric admissions.  She has been under a partial hospitalization program x 2 and December 2023.  She has a psychiatrist as well as a psychologist.  She has had prior suicide attempts with cutting and a suicide attempt where she attempted to hang herself that was interrupted by her mother.  Past medical history is significant for obesity otherwise she has  "no other past medical problems.    Review of Systems:    Review of Systems   Constitutional:  Positive for appetite change and fatigue. Negative for chills and fever.   HENT:  Negative for ear pain and sore throat.    Eyes:  Negative for pain and visual disturbance.   Respiratory:  Negative for cough and shortness of breath.    Cardiovascular:  Negative for chest pain and palpitations.   Gastrointestinal:  Negative for abdominal pain and vomiting.   Genitourinary:  Negative for dysuria and hematuria.   Musculoskeletal:  Negative for arthralgias and back pain.   Skin:  Negative for color change and rash.   Neurological:  Negative for seizures and syncope.   Psychiatric/Behavioral:  Positive for decreased concentration, dysphoric mood, self-injury and sleep disturbance. The patient is nervous/anxious.    All other systems reviewed and are negative.      Past Medical and Surgical History:     Past Medical History:   Diagnosis Date    Anxiety     Depression     Ovarian cyst     left    Panic attack     Psychiatric disorder     Self-injurious behavior     Suicide attempt (HCC)        Past Surgical History:   Procedure Laterality Date    OVARIAN CYST REMOVAL         Meds/Allergies:    all medications and allergies reviewed    Allergies:   Allergies   Allergen Reactions    Bactrim [Sulfamethoxazole-Trimethoprim]        Social History:     Marital Status: /Civil Union    Substance Use History:   Social History     Substance and Sexual Activity   Alcohol Use No     Social History     Tobacco Use   Smoking Status Never   Smokeless Tobacco Never     Social History     Substance and Sexual Activity   Drug Use Never       Family History:    non-contributory    Physical Exam:     Vitals:   Blood Pressure: 139/73 (04/30/24 1133)  Pulse: 100 (04/30/24 1133)  Temperature: 97.6 °F (36.4 °C) (04/30/24 1133)  Temp Source: Temporal (04/30/24 1133)  Respirations: 16 (04/30/24 1133)  Height: 5' 5\" (165.1 cm) (04/30/24 1133)  Weight " - Scale: 104 kg (228 lb 12.8 oz) (04/30/24 1133)  SpO2: 100 % (04/30/24 1133)    Physical Exam  Constitutional:       Appearance: She is obese.   Neurological:      Mental Status: She is oriented to person, place, and time. Mental status is at baseline.   Psychiatric:         Mood and Affect: Mood normal.      Comments: Very tearful         Additional Data:     Lab Results: I have personally reviewed pertinent reports.                      Lab Results   Component Value Date/Time    HGBA1C 5.2 11/28/2023 07:35 AM    HGBA1C 5.4 06/06/2023 08:29 AM    HGBA1C 5.4 02/23/2021 06:54 AM           EKG, Pathology, and Other Studies Reviewed on Admission:   EKG pending    ** Please Note: This note has been constructed using a voice recognition system. **    I have spent a total time of 45 minutes on 04/30/24 in caring for this patient including Diagnostic results, Prognosis, Risks and benefits of tx options, Instructions for management, Patient and family education, Importance of tx compliance, Risk factor reductions, Impressions, Counseling / Coordination of care, Documenting in the medical record, Reviewing / ordering tests, medicine, procedures  , Obtaining or reviewing history  , and Communicating with other healthcare professionals .

## 2024-04-30 NOTE — ED NOTES
Online insurance completed and submitted electronically. Up to 24 hour approval time noted. Printout on chart.

## 2024-04-30 NOTE — ASSESSMENT & PLAN NOTE
Vital signs stable afebrile patient seen and examined by myself all labs from today are pending  Patient medically stable for admit  SL IM will sign off please call with any questions or concerns

## 2024-04-30 NOTE — NURSING NOTE
50 mg atarax po PRN was effective in reducing __ symptoms on reassessment. Pt is no longer presenting with this concern at this time.

## 2024-04-30 NOTE — EMTALA/ACUTE CARE TRANSFER
[facility] EMERGENCY DEPARTMENT  [address] [address]  Dept: [phone]      EMTALA TRANSFER CONSENT    NAME Tiffany Valle                                         1990                              MRN [id number]    I have been informed of my rights regarding examination, treatment, and transfer   by Dr. Basim Bautista DO    Benefits:      Risks: Potential for delay in receiving treatment, Potential deterioration of medical condition, Increased discomfort during transfer, Possible worsening of condition or death during transfer      Consent for Transfer:  I acknowledge that my medical condition has been evaluated and explained to me by the emergency department physician or other qualified medical person and/or my attending physician, who has recommended that I be transferred to the service of  Accepting Physician: Dr Hall at Accepting Facility Name, City & State : [address] The above potential benefits of such transfer, the potential risks associated with such transfer, and the probable risks of not being transferred have been explained to me, and I fully understand them.  The doctor has explained that, in my case, the benefits of transfer outweigh the risks.  I agree to be transferred.    I authorize the performance of emergency medical procedures and treatments upon me in both transit and upon arrival at the receiving facility.  Additionally, I authorize the release of any and all medical records to the receiving facility and request they be transported with me, if possible.  I understand that the safest mode of transportation during a medical emergency is an ambulance and that the Hospital advocates the use of this mode of transport. Risks of traveling to the receiving facility by car, including absence of medical control, life sustaining equipment, such as oxygen, and medical personnel has been explained to me and I fully  understand them.    (RASHID CORRECT BOX BELOW)  [  ]  I consent to the stated transfer and to be transported by ambulance/helicopter.  [  ]  I consent to the stated transfer, but refuse transportation by ambulance and accept full responsibility for my transportation by car.  I understand the risks of non-ambulance transfers and I exonerate the Hospital and its staff from any deterioration in my condition that results from this refusal.    X___________________________________________    DATE  24  TIME________  Signature of patient or legally responsible individual signing on patient behalf           RELATIONSHIP TO PATIENT_________________________          Provider Certification    NAME Tiffany Valle                                         1990                              MRN [id number]    A medical screening exam was performed on the above named patient.  Based on the examination:    Condition Necessitating Transfer The primary encounter diagnosis was Suicidal ideations. Diagnoses of Depression and Anxiety were also pertinent to this visit.    Patient Condition: The patient has been stabilized such that within reasonable medical probability, no material deterioration of the patient condition or the condition of the unborn child(ever) is likely to result from the transfer    Reason for Transfer: Level of Care needed not available at this facility (inpatient behavioral health)    Transfer Requirements: Facility [address]   Space available and qualified personnel available for treatment as acknowledged by Aurelio  Agreed to accept transfer and to provide appropriate medical treatment as acknowledged by       Dr Hall  Appropriate medical records of the examination and treatment of the patient are provided at the time of transfer   STAFF INITIAL WHEN COMPLETED _______  Transfer will be performed by qualified personnel from    and appropriate transfer equipment as required,  including the use of necessary and appropriate life support measures.    Provider Certification: I have examined the patient and explained the following risks and benefits of being transferred/refusing transfer to the patient/family:  General risk, such as traffic hazards, adverse weather conditions, rough terrain or turbulence, possible failure of equipment (including vehicle or aircraft), or consequences of actions of persons outside the control of the transport personnel, Unanticipated needs of medical equipment and personnel during transport, Risk of worsening condition, The possibility of a transport vehicle being unavailable, Consent was not obtained as patient is committed to psychiatric facility and transfer is mandated, The patient is stable for psychiatric transfer because they are medically stable, and is protected from harming him/herself or others during transport      Based on these reasonable risks and benefits to the patient and/or the unborn child(ever), and based upon the information available at the time of the patient’s examination, I certify that the medical benefits reasonably to be expected from the provision of appropriate medical treatments at another medical facility outweigh the increasing risks, if any, to the individual’s medical condition, and in the case of labor to the unborn child, from effecting the transfer.    X____________________________________________ DATE 04/29/24        TIME_______      ORIGINAL - SEND TO MEDICAL RECORDS   COPY - SEND WITH PATIENT DURING TRANSFER

## 2024-04-30 NOTE — NURSING NOTE
Pt observed tearful on phone and in public areas with peers. Often observed with leg shaking up and down while sitting. Difficulty regulating anxiety and emotions. RN spoke therapeutically and discussed projected hospitalization length as well as risks/benefits of 72 hr notice. Pt was considering but declines for now. Pt presented to RN with symptoms of anxiety; recommended and received prn 50 mg PO Atarax.

## 2024-05-01 PROBLEM — F31.81 BIPOLAR 2 DISORDER, MAJOR DEPRESSIVE EPISODE (HCC): Status: ACTIVE | Noted: 2019-04-01

## 2024-05-01 PROBLEM — E61.1 IRON DEFICIENCY: Status: ACTIVE | Noted: 2021-04-16

## 2024-05-01 PROBLEM — R00.2 PALPITATIONS: Status: ACTIVE | Noted: 2021-04-16

## 2024-05-01 PROBLEM — F31.81 BIPOLAR 2 DISORDER, MAJOR DEPRESSIVE EPISODE (HCC): Chronic | Status: ACTIVE | Noted: 2019-04-01

## 2024-05-01 PROBLEM — R53.83 FATIGUE: Status: ACTIVE | Noted: 2019-01-11

## 2024-05-01 PROBLEM — R10.2 PELVIC PAIN IN FEMALE: Status: ACTIVE | Noted: 2019-04-24

## 2024-05-01 PROBLEM — F32.81 PMDD (PREMENSTRUAL DYSPHORIC DISORDER): Status: ACTIVE | Noted: 2023-08-25

## 2024-05-01 PROBLEM — F43.10 PTSD (POST-TRAUMATIC STRESS DISORDER): Chronic | Status: ACTIVE | Noted: 2022-12-14

## 2024-05-01 PROBLEM — F41.1 GAD (GENERALIZED ANXIETY DISORDER): Chronic | Status: ACTIVE | Noted: 2024-05-01

## 2024-05-01 PROBLEM — N80.9 ENDOMETRIOSIS DETERMINED BY LAPAROSCOPY: Status: ACTIVE | Noted: 2019-04-24

## 2024-05-01 PROBLEM — E55.9 VITAMIN D DEFICIENCY: Status: ACTIVE | Noted: 2023-11-20

## 2024-05-01 PROBLEM — F43.10 PTSD (POST-TRAUMATIC STRESS DISORDER): Status: ACTIVE | Noted: 2022-12-14

## 2024-05-01 PROBLEM — F43.12 POST-TRAUMATIC STRESS DISORDER, CHRONIC: Chronic | Status: ACTIVE | Noted: 2022-12-14

## 2024-05-01 PROBLEM — F41.0 PANIC DISORDER: Chronic | Status: ACTIVE | Noted: 2019-01-11

## 2024-05-01 PROBLEM — J45.990 MILD EXERCISE-INDUCED ASTHMA: Status: ACTIVE | Noted: 2019-01-11

## 2024-05-01 LAB
AMORPH PHOS CRY URNS QL MICRO: ABNORMAL /HPF
ATRIAL RATE: 75 BPM
ATRIAL RATE: 90 BPM
ATRIAL RATE: 91 BPM
BACTERIA UR QL AUTO: ABNORMAL /HPF
BILIRUB UR QL STRIP: NEGATIVE
CLARITY UR: ABNORMAL
COLOR UR: YELLOW
GLUCOSE SERPL-MCNC: 130 MG/DL (ref 65–140)
GLUCOSE UR STRIP-MCNC: NEGATIVE MG/DL
HCG SERPL QL: NEGATIVE
HGB UR QL STRIP.AUTO: NEGATIVE
KETONES UR STRIP-MCNC: NEGATIVE MG/DL
LEUKOCYTE ESTERASE UR QL STRIP: 100
MUCOUS THREADS UR QL AUTO: ABNORMAL
NITRITE UR QL STRIP: NEGATIVE
NON-SQ EPI CELLS URNS QL MICRO: ABNORMAL /HPF
P AXIS: 32 DEGREES
P AXIS: 39 DEGREES
P AXIS: 41 DEGREES
PH UR STRIP.AUTO: 7 [PH]
PR INTERVAL: 120 MS
PR INTERVAL: 126 MS
PR INTERVAL: 142 MS
PROT UR STRIP-MCNC: NEGATIVE MG/DL
QRS AXIS: 52 DEGREES
QRS AXIS: 55 DEGREES
QRS AXIS: 57 DEGREES
QRSD INTERVAL: 80 MS
QRSD INTERVAL: 82 MS
QRSD INTERVAL: 82 MS
QT INTERVAL: 366 MS
QT INTERVAL: 370 MS
QT INTERVAL: 390 MS
QTC INTERVAL: 435 MS
QTC INTERVAL: 450 MS
QTC INTERVAL: 452 MS
RBC #/AREA URNS AUTO: ABNORMAL /HPF
SP GR UR STRIP.AUTO: 1.01 (ref 1–1.04)
T WAVE AXIS: 14 DEGREES
T WAVE AXIS: 17 DEGREES
T WAVE AXIS: 23 DEGREES
UROBILINOGEN UA: NEGATIVE MG/DL
VENTRICULAR RATE: 75 BPM
VENTRICULAR RATE: 90 BPM
VENTRICULAR RATE: 91 BPM
WBC #/AREA URNS AUTO: ABNORMAL /HPF

## 2024-05-01 PROCEDURE — 82948 REAGENT STRIP/BLOOD GLUCOSE: CPT

## 2024-05-01 PROCEDURE — 81001 URINALYSIS AUTO W/SCOPE: CPT | Performed by: NURSE PRACTITIONER

## 2024-05-01 PROCEDURE — 93005 ELECTROCARDIOGRAM TRACING: CPT

## 2024-05-01 PROCEDURE — 81003 URINALYSIS AUTO W/O SCOPE: CPT | Performed by: NURSE PRACTITIONER

## 2024-05-01 PROCEDURE — 99223 1ST HOSP IP/OBS HIGH 75: CPT | Performed by: PSYCHIATRY & NEUROLOGY

## 2024-05-01 PROCEDURE — 93010 ELECTROCARDIOGRAM REPORT: CPT | Performed by: INTERNAL MEDICINE

## 2024-05-01 RX ORDER — LURASIDONE HYDROCHLORIDE 80 MG/1
80 TABLET, FILM COATED ORAL
Status: DISCONTINUED | OUTPATIENT
Start: 2024-05-01 | End: 2024-05-07 | Stop reason: HOSPADM

## 2024-05-01 RX ORDER — SERTRALINE HYDROCHLORIDE 25 MG/1
25 TABLET, FILM COATED ORAL DAILY
Status: COMPLETED | OUTPATIENT
Start: 2024-05-01 | End: 2024-05-01

## 2024-05-01 RX ORDER — HYDROXYZINE 50 MG/1
50 TABLET, FILM COATED ORAL 2 TIMES DAILY
Status: DISCONTINUED | OUTPATIENT
Start: 2024-05-01 | End: 2024-05-07 | Stop reason: HOSPADM

## 2024-05-01 RX ADMIN — BUSPIRONE HYDROCHLORIDE 10 MG: 10 TABLET ORAL at 08:05

## 2024-05-01 RX ADMIN — HYDROXYZINE HYDROCHLORIDE 50 MG: 50 TABLET, FILM COATED ORAL at 08:05

## 2024-05-01 RX ADMIN — LURASIDONE HYDROCHLORIDE 80 MG: 80 TABLET, COATED ORAL at 17:28

## 2024-05-01 RX ADMIN — CYANOCOBALAMIN TAB 1000 MCG 1000 MCG: 1000 TAB at 11:39

## 2024-05-01 RX ADMIN — Medication 2000 UNITS: at 11:39

## 2024-05-01 RX ADMIN — SERTRALINE HYDROCHLORIDE 25 MG: 25 TABLET ORAL at 11:39

## 2024-05-01 RX ADMIN — HYDROXYZINE HYDROCHLORIDE 50 MG: 50 TABLET, FILM COATED ORAL at 17:28

## 2024-05-01 RX ADMIN — TRAZODONE HYDROCHLORIDE 50 MG: 50 TABLET ORAL at 21:21

## 2024-05-01 RX ADMIN — HYDROXYZINE HYDROCHLORIDE 50 MG: 50 TABLET, FILM COATED ORAL at 20:27
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
## 2024-05-01 NOTE — PLAN OF CARE
Problem: Ineffective Coping  Goal: Cooperates with admission process  Description: Interventions:   - Complete admission process  Outcome: Progressing     Problem: Ineffective Coping  Goal: Identifies ineffective coping skills  Outcome: Progressing     Problem: Ineffective Coping  Goal: Identifies healthy coping skills  Outcome: Progressing     Problem: Ineffective Coping  Goal: Demonstrates healthy coping skills  Outcome: Progressing
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
## 2024-05-01 NOTE — NURSING NOTE
"Patient is isolative to room. Not visible in the milieu, denies SI,HI,VH,AH. Depressed/flat affect. Cooperative but scant in her responses, says \"I am tired\" and proceeds to get back in bed. Compliant with scheduled medication. Denies any unmet needs at this time. Q 7 checks continued.   "

## 2024-05-01 NOTE — NURSING NOTE
Pt verbally denies SI, HI and A/V hallucinations. Pt has improved mood, although anxious. Cooperative and visible in milieu, socializing with peers. Compliant with meds. No indications of psychosis or increased severity of anxiety or depression. Proper eye contact and concentration.

## 2024-05-01 NOTE — NURSING NOTE
50 mg MED PRN was effective in reducing anxiety symptoms on reassessment. Pt is no longer presenting with this concern at this time.

## 2024-05-01 NOTE — TREATMENT PLAN
TREATMENT PLAN REVIEW - Behavioral Health Tiffany Valle 33 y.o. 1990 female MRN: [id number]    [facility] 3P BEHAVIORAL HLTH Room / Bed: [facility] 382/[facility] 382-01 Encounter: [id number]        Admit Date/Time:  4/30/2024 11:23 AM    Treatment Team:   MD Rajwinder Hardin RN Danielle Merk David McCoy, RN Peter J Cloney, DO Bushra Nunez    Diagnosis: Principal Problem:    Bipolar 2 disorder, major depressive episode (HCC)  Active Problems:    SOCO (generalized anxiety disorder)    Post-traumatic stress disorder, chronic    Panic disorder without agoraphobia    PMDD (premenstrual dysphoric disorder)    Obesity (BMI 35.0-39.9 without comorbidity)    Medical clearance for psychiatric admission      Patient Strengths/Assets: ability for insight, average or above intelligence, capable of independent living, cooperative, communication skills, compliant with medication, family ties, financial means, general fund of knowledge, good physical health, motivation for treatment/growth, stable housing, well educated, work skills      Patient Barriers/Limitations: chronic mental illness, low self esteem    Short Term Goals: decrease in depressive symptoms, decrease in anxiety symptoms, decrease in suicidal thoughts, ability to stay safe on the unit, improvement in self care, tolerate medications    Long Term Goals: improvement in depression, improvement in anxiety, stabilization of mood, free of suicidal thoughts, improved insight, adequate self care, establishment of family support upon discharge    Progress Towards Goals: starting psychiatric medications as prescribed    Recommended Treatment: medication management, patient medication education, group therapy, milieu therapy, continued Behavioral Health psychiatric evaluation/assessment process     Treatment Frequency: daily medication monitoring, group and milieu therapy daily, monitoring  through interdisciplinary rounds, monitoring through weekly patient care conferences    Expected Discharge Date: 10 days - 5/11/2024    Discharge Plan: referrals as indicated    Treatment Plan Created/Updated By: David Linn DO
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
## 2024-05-02 PROCEDURE — 99232 SBSQ HOSP IP/OBS MODERATE 35: CPT | Performed by: PSYCHIATRY & NEUROLOGY

## 2024-05-02 RX ORDER — GABAPENTIN 100 MG/1
100 CAPSULE ORAL 3 TIMES DAILY
Status: DISCONTINUED | OUTPATIENT
Start: 2024-05-02 | End: 2024-05-03

## 2024-05-02 RX ADMIN — Medication 2000 UNITS: at 08:07

## 2024-05-02 RX ADMIN — SERTRALINE HYDROCHLORIDE 50 MG: 50 TABLET ORAL at 08:07

## 2024-05-02 RX ADMIN — HYDROXYZINE HYDROCHLORIDE 50 MG: 50 TABLET, FILM COATED ORAL at 17:18

## 2024-05-02 RX ADMIN — GABAPENTIN 100 MG: 100 CAPSULE ORAL at 12:45

## 2024-05-02 RX ADMIN — CYANOCOBALAMIN TAB 1000 MCG 1000 MCG: 1000 TAB at 08:07

## 2024-05-02 RX ADMIN — GABAPENTIN 100 MG: 100 CAPSULE ORAL at 21:03

## 2024-05-02 RX ADMIN — TRAZODONE HYDROCHLORIDE 50 MG: 50 TABLET ORAL at 21:03

## 2024-05-02 RX ADMIN — LURASIDONE HYDROCHLORIDE 80 MG: 80 TABLET, COATED ORAL at 17:18

## 2024-05-02 RX ADMIN — HYDROXYZINE HYDROCHLORIDE 50 MG: 50 TABLET, FILM COATED ORAL at 08:07

## 2024-05-02 RX ADMIN — GABAPENTIN 100 MG: 100 CAPSULE ORAL at 17:18

## 2024-05-02 NOTE — NURSING NOTE
"Pt reported to the nurses station tearful requesting anxiety medication. When assessing pt, pt stated \"My anxiety increases around this time\". Murrell score 21, given PRN atarax 50mg for moderate anxiety.   "

## 2024-05-02 NOTE — PROGRESS NOTES
05/02/24 0907   Team Meeting   Meeting Type Daily Rounds   Team Members Present   Team Members Present Physician;Nurse;   Physician Team Member Valerie   Nursing Team Member Erik   Care Management Team Member Dustin   Patient/Family Present   Patient Present No   Patient's Family Present No     201. Pt is calm and cooperative. Pt is meal/ med compliant. Pt is visible in the unit and social with peers. Pt denies all symptom. Pt is discharge focused. Discharge TBD.

## 2024-05-02 NOTE — PLAN OF CARE
Problem: Ineffective Coping  Goal: Identifies ineffective coping skills  Outcome: Progressing  Goal: Identifies healthy coping skills  Outcome: Progressing  Goal: Demonstrates healthy coping skills  Outcome: Progressing  Goal: Participates in unit activities  Description: Interventions:  - Provide therapeutic environment   - Provide required programming   - Redirect inappropriate behaviors   Outcome: Progressing  Goal: Patient/Family verbalizes awareness of resources  Outcome: Progressing     Problem: Depression  Goal: Treatment Goal: Demonstrate behavioral control of depressive symptoms, verbalize feelings of improved mood/affect, and adopt new coping skills prior to discharge  Outcome: Progressing  Goal: Refrain from harming self  Description: Interventions:  - Monitor patient closely, per order   - Supervise medication ingestion, monitor effects and side effects   Outcome: Progressing  Goal: Refrain from isolation  Description: Interventions:  - Develop a trusting relationship   - Encourage socialization   Outcome: Progressing  Goal: Refrain from self-neglect  Outcome: Progressing     Problem: Anxiety  Goal: Anxiety is at manageable level  Description: Interventions:  - Assess and monitor patient's anxiety level.   - Monitor for signs and symptoms (heart palpitations, chest pain, shortness of breath, headaches, nausea, feeling jumpy, restlessness, irritable, apprehensive).   - Collaborate with interdisciplinary team and initiate plan and interventions as ordered.  - [place] patient to unit/surroundings  - Explain treatment plan  - Encourage participation in care  - Encourage verbalization of concerns/fears  - Identify coping mechanisms  - Assist in developing anxiety-reducing skills  - Administer/offer alternative therapies  - Limit or eliminate stimulants  Outcome: Not Progressing

## 2024-05-02 NOTE — NURSING NOTE
Pt is calm and cooperative. Visible in the milieu, social with peers. Able to make needs known to staff. Reports depression and anxiety. Denies SI, HI, AH, VH, and pain. Pt reported that atarax was helpful with decreasing anxiety, PRN effective. Compliant with HS trazodone and snack. Denies further unmet needs at this time.

## 2024-05-02 NOTE — NURSING NOTE
Patient is tearful this morning. Calm and cooperative. Denies SI, HI, SIB, auditory and visual hallucinations. Patient is med and meal compliant. Visible on the unit and social with peers. Discharge focused. Denies any unmet needs at this time. Q7 safety checks ongoing.

## 2024-05-02 NOTE — PROGRESS NOTES
Progress Note - Behavioral Health   Tiffany Valle 33 y.o. female MRN: [id number]  Unit/Bed#: -01 Encounter: [id number]    Assessment/Plan   Principal Problem:    Bipolar 2 disorder, major depressive episode (HCC)  Active Problems:    SOCO (generalized anxiety disorder)    Post-traumatic stress disorder, chronic    Panic disorder without agoraphobia    PMDD (premenstrual dysphoric disorder)    Obesity (BMI 35.0-39.9 without comorbidity)    Medical clearance for psychiatric admission      Recommended Treatment:      Will make the following medication changes:  Continue with current medications:  Vitamin D3 2000 units daily for supplementation.  Vitamin B12 1000 mcg daily for supplementation.  Atarax 50 mg twice daily for anxiety and panic attacks.  Latuda 80 mg daily with dinner for mood stabilization.  Zoloft 50 mg daily for depressive, anxiety and PTSD symptoms.  Trazodone 50 mg at bedtime for insomnia.  Start gabapentin 100 mg 3 times daily for anxiety and panic attacks.  Continue with group therapy, milieu therapy and occupational therapy.    Continue frequent safety checks and vitals per unit protocol.  Continue with SLIM medical management as indicated  Continue coordinating with case management regarding disposition    Legal Status: 201  Disposition: coordinating with case management, tentative discharge next week, date TBD.    Case discussed with treatment team.  Risks, benefits and possible side effects of Medications: Risks, benefits, and possible side effects of medications have been explained to the patient, who verbalizes understanding    ------------------------------------------------------------    Subjective: Patient's chart was reviewed, and patient's progress and plan was discussed with treatment team. Per nursing report, Tiffany has been tearful, anxious, reporting anxiety and receiving as needed Atarax 50 mg which was effective, visible and social with peers, and later denying psychiatric  "symptoms and ultimately cooperative on the unit and compliant with medications. Patient has remained in behavioral control for the last 24 hours. Last night patient was documented to have slept throughout the night.    Tiffany was evaluated this morning for continuity of care. On examination, Tiffany is anxious with regards to affect, but less dysphoric appearing in general, she has concerns with regards to medication regimen at which time psychopharmacologic education was provided and she appeared to be relieved. She states her mood is \" anxious but a little better.\" She reports sleeping improved from last night and her energy level today is adequate. Her appetite has been improving slowly. She denies adverse effects from medications and is amenable to starting scheduled gabapentin for anxiety throughout the day. She denies suicidal ideation, homicidal ideation, auditory hallucinations, and visual hallucinations. Her goals for today are to remain in behavioral control and medication compliant.    VS: Reviewed,  BMI of 38.07, otherwise within normal limits    Progress Toward Goals: Gradual improvement    Psychiatric Review of Systems:  Behavior over the last 24 hours: improved  Sleep: improving  Appetite: improving  Medication side effects: none verbalized  Medical ROS:  No cough, chest pain, nausea, vomiting, diarrhea, all other symptoms are negative.    Vital signs in last 24 hours:  Temp:  [97 °F (36.1 °C)-97.7 °F (36.5 °C)] 97 °F (36.1 °C)  HR:  [] 100  Resp:  [16-18] 18  BP: (117-132)/(58-85) 117/58    Mental Status Exam:    Appearance:  alert, good eye contact, appears stated age, casually dressed, appropriate grooming and hygiene, obese, and wearing glasses   Behavior:  cooperative, sitting comfortably, and restless and fidgety   Speech:  spontaneous, clear, normal rate, soft, and coherent   Mood:  \"Anxious but a little better\"   Affect:  Less dysphoric, anxious   Thought Process:  generally linear and " goal-directed but perseverative at times   Associations: intact associations   Thought Content:  no verbalized delusions or overt paranoia, ruminating thoughts   Perceptual Disturbances: denies current hallucinations and does not appear to be responding to internal stimuli at this time   Risk Potential: Suicidal ideation - None at present  Homicidal ideation - None at present  Potential for aggression - Not at present   Sensorium:  oriented to person, place, time/date, and situation   Memory:  recent and remote memory grossly intact   Consciousness:  alert and awake   Attention/Concentration: attention span and concentration appear shorter than expected for age   Insight:  improving   Judgment: improving   Gait/Station: normal gait/station   Motor Activity: no abnormal movements     Current Medications:  Current Facility-Administered Medications   Medication Dose Route Frequency Provider Last Rate    acetaminophen  650 mg Oral Q6H PRN Sade Hall MD      acetaminophen  650 mg Oral Q4H PRN Sade Hall MD      acetaminophen  975 mg Oral Q6H PRN Sade Hall MD      albuterol  2 puff Inhalation Q4H PRN STACIE Tinsley      aluminum-magnesium hydroxide-simethicone  30 mL Oral Q4H PRN Sade Hall MD      haloperidol lactate  2.5 mg Intramuscular Q4H PRN Max 4/day Sade Hall MD      And    LORazepam  1 mg Intramuscular Q4H PRN Max 4/day Sade Hall MD      And    benztropine  0.5 mg Intramuscular Q4H PRN Max 4/day Sade Hall MD      haloperidol lactate  5 mg Intramuscular Q4H PRN Max 4/day Sade Hall MD      And    LORazepam  2 mg Intramuscular Q4H PRN Max 4/day Sade Hall MD      And    benztropine  1 mg Intramuscular Q4H PRN Max 4/day Sade Hall MD      benztropine  1 mg Oral Q4H PRN Max 6/day Sade Hall MD      bisacodyl  10 mg Rectal Daily PRN Sade Hall MD      Cholecalciferol  2,000 Units Oral Daily STACIE Trejo       cyanocobalamin  1,000 mcg Oral Daily STACIE Trejo      hydrOXYzine HCL  50 mg Oral Q6H PRN Max 4/day Sade Hall MD      Or    diphenhydrAMINE  50 mg Intramuscular Q6H PRN Sade Hall MD      gabapentin  100 mg Oral TID David Linn, DO      haloperidol  1 mg Oral Q6H PRN Sade Hall MD      haloperidol  2.5 mg Oral Q4H PRN Max 4/day Sade Hall MD      haloperidol  5 mg Oral Q4H PRN Max 4/day Sade Hall MD      hydrOXYzine HCL  100 mg Oral Q6H PRN Max 4/day Sade Hall MD      Or    LORazepam  2 mg Intramuscular Q6H PRN Sade Hall MD      hydrOXYzine HCL  25 mg Oral Q6H PRN Max 4/day Sade Hall MD      hydrOXYzine HCL  50 mg Oral BID David Linn, DO      loratadine  10 mg Oral Daily PRN STACIE Tinsley      lurasidone  80 mg Oral Daily With Dinner David Linn, DO      polyethylene glycol  17 g Oral Daily PRN Sade Hall MD      senna-docusate sodium  1 tablet Oral Daily PRN Sade Hall MD      sertraline  50 mg Oral Daily David Linn, DO      traZODone  50 mg Oral HS PRN Sade Hall MD      traZODone  50 mg Oral HS Han Sebastian MD         Behavioral Health Medications: all current active meds have been reviewed. Changes as in plan section above.    Laboratory results:  I have personally reviewed all pertinent laboratory/tests results.   Recent Results (from the past 48 hour(s))   Comprehensive metabolic panel    Collection Time: 04/30/24  4:31 PM   Result Value Ref Range    Sodium 138 135 - 147 mmol/L    Potassium 3.7 3.5 - 5.3 mmol/L    Chloride 103 96 - 108 mmol/L    CO2 23 21 - 32 mmol/L    ANION GAP 12 4 - 13 mmol/L    BUN 16 5 - 25 mg/dL    Creatinine 1.10 0.60 - 1.30 mg/dL    Glucose 121 65 - 140 mg/dL    Calcium 9.2 8.4 - 10.2 mg/dL    AST 14 13 - 39 U/L    ALT 12 7 - 52 U/L    Alkaline Phosphatase 70 34 - 104 U/L    Total Protein 7.6 6.4 - 8.4 g/dL    Albumin 4.7 3.5 - 5.0 g/dL    Total Bilirubin 0.93  mg/dL    eGFR 65 ml/min/1.73sq m   CBC and differential    Collection Time: 04/30/24  4:31 PM   Result Value Ref Range    WBC 7.74 4.31 - 10.16 Thousand/uL    RBC 4.95 3.81 - 5.12 Million/uL    Hemoglobin 13.8 11.5 - 15.4 g/dL    Hematocrit 43.6 34.8 - 46.1 %    MCV 88 82 - 98 fL    MCH 27.9 26.8 - 34.3 pg    MCHC 31.7 31.4 - 37.4 g/dL    RDW 12.9 11.6 - 15.1 %    MPV 9.7 8.9 - 12.7 fL    Platelets 316 149 - 390 Thousands/uL    nRBC 0 /100 WBCs    Segmented % 48 43 - 75 %    Immature Grans % 0 0 - 2 %    Lymphocytes % 40 14 - 44 %    Monocytes % 5 4 - 12 %    Eosinophils Relative 6 0 - 6 %    Basophils Relative 1 0 - 1 %    Absolute Neutrophils 3.74 1.85 - 7.62 Thousands/µL    Absolute Immature Grans 0.01 0.00 - 0.20 Thousand/uL    Absolute Lymphocytes 3.10 0.60 - 4.47 Thousands/µL    Absolute Monocytes 0.37 0.17 - 1.22 Thousand/µL    Eosinophils Absolute 0.46 0.00 - 0.61 Thousand/µL    Basophils Absolute 0.06 0.00 - 0.10 Thousands/µL   TSH, 3rd generation with Free T4 reflex    Collection Time: 04/30/24  4:31 PM   Result Value Ref Range    TSH 3RD GENERATON 1.177 0.450 - 4.500 uIU/mL   Vitamin B12    Collection Time: 04/30/24  4:31 PM   Result Value Ref Range    Vitamin B-12 295 180 - 914 pg/mL   Folate    Collection Time: 04/30/24  4:31 PM   Result Value Ref Range    Folate 9.3 >5.9 ng/mL   Vitamin D 25 hydroxy    Collection Time: 04/30/24  4:31 PM   Result Value Ref Range    Vit D, 25-Hydroxy 19.9 (L) 30.0 - 100.0 ng/mL   Lipid panel    Collection Time: 04/30/24  4:31 PM   Result Value Ref Range    Cholesterol 178 See Comment mg/dL    Triglycerides 152 (H) See Comment mg/dL    HDL, Direct 43 (L) >=50 mg/dL    LDL Calculated 105 (H) 0 - 100 mg/dL    Non-HDL-Chol (CHOL-HDL) 135 mg/dl   Hemoglobin A1C    Collection Time: 04/30/24  4:31 PM   Result Value Ref Range    Hemoglobin A1C 5.3 Normal 4.0-5.6%; PreDiabetic 5.7-6.4%; Diabetic >=6.5%; Glycemic control for adults with diabetes <7.0% %     mg/dl    Pregnancy Test (HCG Qualitative)    Collection Time: 04/30/24  4:31 PM   Result Value Ref Range    Preg, Serum Negative Negative   ECG 12 lead    Collection Time: 04/30/24  6:16 PM   Result Value Ref Range    Ventricular Rate 90 BPM    Atrial Rate 90 BPM    VT Interval 126 ms    QRSD Interval 82 ms    QT Interval 370 ms    QTC Interval 452 ms    P Dania 39 degrees    QRS Axis 55 degrees    T Wave Axis 14 degrees   ECG 12 lead    Collection Time: 04/30/24  6:19 PM   Result Value Ref Range    Ventricular Rate 91 BPM    Atrial Rate 91 BPM    VT Interval 142 ms    QRSD Interval 82 ms    QT Interval 366 ms    QTC Interval 450 ms    P Axis 41 degrees    QRS Axis 57 degrees    T Wave Axis 23 degrees   Urinalysis    Collection Time: 04/30/24  6:54 PM   Result Value Ref Range    Clarity, UA Clear Clear, Other    Color, UA Anne (A) Straw, Yellow, Pale Yellow    Specific Gravity, UA 1.025 1.003 - 1.040    pH, UA 5.0 4.5, 5.0, 5.5, 6.0, 6.5, 7.0, 7.5, 8.0    Glucose, UA Negative Negative mg/dl    Ketones, UA Negative Negative mg/dl    Occult Blood, UA 10.0 (A) Negative    Protein, UA 15 (Trace) (A) Negative mg/dl    Nitrite, UA Negative Negative    Bilirubin, UA Negative Negative    Leukocytes, .0 (A) Negative    WBC, UA 4-10 (A) None Seen, 2-4, 5-60 /hpf    RBC, UA 0-1 (A) None Seen, 2-4 /hpf    Bacteria, UA Occasional None Seen, Occasional /hpf    Epithelial Cells Moderate (A) None Seen, Occasional /hpf    UROBILINOGEN UA Negative 1.0, Negative mg/dL   Fingerstick Glucose (POCT)    Collection Time: 05/01/24  8:45 AM   Result Value Ref Range    POC Glucose 130 65 - 140 mg/dl   ECG 12 lead    Collection Time: 05/01/24 11:26 AM   Result Value Ref Range    Ventricular Rate 75 BPM    Atrial Rate 75 BPM    VT Interval 120 ms    QRSD Interval 80 ms    QT Interval 390 ms    QTC Interval 435 ms    P Dania 32 degrees    QRS Axis 52 degrees    T Wave Axis 17 degrees   UA w Reflex to Microscopic w Reflex to Culture    Collection  Time: 05/01/24 12:15 PM    Specimen: Urine, Clean Catch   Result Value Ref Range    Color, UA Yellow Straw, Yellow, Pale Yellow    Clarity, UA Slightly Cloudy (A) Clear, Other    Specific Gravity, UA 1.015 1.003 - 1.040    pH, UA 7.0 4.5, 5.0, 5.5, 6.0, 6.5, 7.0, 7.5, 8.0    Leukocytes, .0 (A) Negative    Nitrite, UA Negative Negative    Protein, UA Negative Negative mg/dl    Glucose, UA Negative Negative mg/dl    Ketones, UA Negative Negative mg/dl    Bilirubin, UA Negative Negative    Occult Blood, UA Negative Negative    UROBILINOGEN UA Negative 1.0, Negative mg/dL   Urine Microscopic    Collection Time: 05/01/24 12:15 PM   Result Value Ref Range    RBC, UA None Seen None Seen, 0-1, 1-2, 2-4, 0-5 /hpf    WBC, UA 4-10 (A) None Seen, 0-1, 1-2, 0-5, 2-4 /hpf    Epithelial Cells Moderate (A) None Seen, Occasional /hpf    Bacteria, UA Moderate (A) None Seen, Occasional /hpf    AMORPH PHOSPATES Moderate /hpf    MUCUS THREADS Moderate (A) None Seen        This note has been constructed using a voice recognition system. There may be translation, syntax, or grammatical errors. If you have any questions, please contact the dictating author.    David Linn, DO  Psychiatry Residency, PGY-2

## 2024-05-02 NOTE — PLAN OF CARE
Problem: Ineffective Coping  Goal: Participates in unit activities  Description: Interventions:  - Provide therapeutic environment   - Provide required programming   - Redirect inappropriate behaviors   Outcome: Progressing     Problem: Depression  Goal: Attend and participate in unit activities, including therapeutic, recreational, and educational groups  Description: Interventions:  - Provide therapeutic and educational activities daily, encourage attendance and participation, and document same in the medical record   Outcome: Progressing

## 2024-05-03 PROCEDURE — 99232 SBSQ HOSP IP/OBS MODERATE 35: CPT | Performed by: PSYCHIATRY & NEUROLOGY

## 2024-05-03 RX ORDER — GABAPENTIN 300 MG/1
300 CAPSULE ORAL 3 TIMES DAILY
Status: DISCONTINUED | OUTPATIENT
Start: 2024-05-04 | End: 2024-05-07 | Stop reason: HOSPADM

## 2024-05-03 RX ORDER — GABAPENTIN 100 MG/1
200 CAPSULE ORAL 3 TIMES DAILY
Status: COMPLETED | OUTPATIENT
Start: 2024-05-03 | End: 2024-05-03

## 2024-05-03 RX ADMIN — HYDROXYZINE HYDROCHLORIDE 50 MG: 50 TABLET, FILM COATED ORAL at 08:11

## 2024-05-03 RX ADMIN — CYANOCOBALAMIN TAB 1000 MCG 1000 MCG: 1000 TAB at 08:11

## 2024-05-03 RX ADMIN — GABAPENTIN 200 MG: 100 CAPSULE ORAL at 21:05

## 2024-05-03 RX ADMIN — GABAPENTIN 200 MG: 100 CAPSULE ORAL at 15:41

## 2024-05-03 RX ADMIN — TRAZODONE HYDROCHLORIDE 50 MG: 50 TABLET ORAL at 21:05

## 2024-05-03 RX ADMIN — LURASIDONE HYDROCHLORIDE 80 MG: 80 TABLET, COATED ORAL at 17:39

## 2024-05-03 RX ADMIN — HYDROXYZINE HYDROCHLORIDE 50 MG: 50 TABLET, FILM COATED ORAL at 17:39

## 2024-05-03 RX ADMIN — SERTRALINE HYDROCHLORIDE 50 MG: 50 TABLET ORAL at 08:11

## 2024-05-03 RX ADMIN — Medication 2000 UNITS: at 08:11

## 2024-05-03 RX ADMIN — GABAPENTIN 100 MG: 100 CAPSULE ORAL at 08:11

## 2024-05-03 NOTE — PLAN OF CARE
Problem: Ineffective Coping  Goal: Cooperates with admission process  Description: Interventions:   - Complete admission process  Outcome: Progressing  Goal: Identifies ineffective coping skills  Outcome: Progressing  Goal: Identifies healthy coping skills  Outcome: Progressing  Goal: Demonstrates healthy coping skills  Outcome: Progressing  Goal: Participates in unit activities  Description: Interventions:  - Provide therapeutic environment   - Provide required programming   - Redirect inappropriate behaviors   Outcome: Progressing  Goal: Patient/Family verbalizes awareness of resources  Outcome: Progressing  Goal: Understands least restrictive measures  Description: Interventions:  - Utilize least restrictive behavior  Outcome: Progressing  Goal: Free from restraint events  Description: - Utilize least restrictive measures   - Provide behavioral interventions   - Redirect inappropriate behaviors   Outcome: Progressing     Problem: Depression  Goal: Treatment Goal: Demonstrate behavioral control of depressive symptoms, verbalize feelings of improved mood/affect, and adopt new coping skills prior to discharge  Outcome: Progressing  Goal: Refrain from self-neglect  Outcome: Progressing  Goal: Attend and participate in unit activities, including therapeutic, recreational, and educational groups  Description: Interventions:  - Provide therapeutic and educational activities daily, encourage attendance and participation, and document same in the medical record   Outcome: Progressing     Problem: Anxiety  Goal: Anxiety is at manageable level  Description: Interventions:  - Assess and monitor patient's anxiety level.   - Monitor for signs and symptoms (heart palpitations, chest pain, shortness of breath, headaches, nausea, feeling jumpy, restlessness, irritable, apprehensive).   - Collaborate with interdisciplinary team and initiate plan and interventions as ordered.  - [place] patient to unit/surroundings  - Explain  treatment plan  - Encourage participation in care  - Encourage verbalization of concerns/fears  - Identify coping mechanisms  - Assist in developing anxiety-reducing skills  - Administer/offer alternative therapies  - Limit or eliminate stimulants  Outcome: Progressing

## 2024-05-03 NOTE — NURSING NOTE
Pt denies SI/HI/AH/VH. Pt reports anxiety but no depression this morning. Medication and meal compliant. Present in dayroom and milieu. Social with peers.  Pt is calm and pleasant. Pt compliant with Lunch. No further concerns as of present. Plan of care ongoing.

## 2024-05-03 NOTE — PROGRESS NOTES
05/03/24 1003   Team Meeting   Meeting Type Daily Rounds   Team Members Present   Team Members Present Physician;Nurse;   Physician Team Member Valerie   [facility]ing Team [facility] Member Dustin   Patient/Family Present   Patient Present No   Patient's Family Present No     Tx plan was reviewed and discussed with Pt. Pt was encouraged to attend groups. Medication was discussed with Pt. Pt signed tx plan.

## 2024-05-03 NOTE — PROGRESS NOTES
Progress Note - Behavioral Health   Tiffany Valle 33 y.o. female MRN: [id number]  Unit/Bed#: U 382-01 Encounter: [id number]    Assessment/Plan   Principal Problem:    Bipolar 2 disorder, major depressive episode (HCC)  Active Problems:    SOCO (generalized anxiety disorder)    Post-traumatic stress disorder, chronic    Panic disorder without agoraphobia    PMDD (premenstrual dysphoric disorder)    Obesity (BMI 35.0-39.9 without comorbidity)    Medical clearance for psychiatric admission      Recommended Treatment:      Will make the following medication changes:  Continue with current medications:  Vitamin D3 2000 units daily for supplementation.  Vitamin B12 1000 mcg daily for supplementation.  Atarax 50 mg twice daily for anxiety and panic attacks.  Latuda 80 mg daily with dinner for mood stabilization.  Zoloft 50 mg daily for depressive, anxiety, and PTSD symptoms.  Trazodone 50 mg at bedtime for insomnia.  Increase gabapentin to 200 mg 3 times daily for anxiety and panic attacks.   Plan to increase to 300 mg 3 times daily for anxiety and panic attacks starting tomorrow on 5/4/2024.  Continue with group therapy, milieu therapy and occupational therapy.    Continue frequent safety checks and vitals per unit protocol.  Continue with [facility] medical management as indicated  Continue coordinating with case management regarding disposition    Legal Status: 201  Disposition: coordinating with case management, tentative discharge early next week.    Case discussed with treatment team.  Risks, benefits and possible side effects of Medications: Risks, benefits, and possible side effects of medications have been explained to the patient, who verbalizes understanding    ------------------------------------------------------------    Subjective: Patient's chart was reviewed, and patient's progress and plan was discussed with treatment team. Per nursing report, Tiffany has been pleasant, isolative to room, denying psychiatric  "symptoms and ultimately cooperative on the unit and compliant with medications. Patient has remained in behavioral control for the last 24 hours. Last night patient was documented to have slept throughout the night.    Tiffany was evaluated this morning for continuity of care. On examination, Tiffany is less anxious appearing, less dysphoric appearing, but still appears restless at times and she perseverates on discharge. She states her mood is \" a little bit better today.\" She reports sleeping improved from yesterday and her energy level today is adequate. Her appetite has been improving. She denies adverse effects from medications she was amenable to further titration of gabapentin at this time. She denies suicidal ideation, homicidal ideation, auditory hallucinations, and visual hallucinations. Her goals for today are to remain in behavioral control, medication compliant, and see if she can schedule a family meeting with her  over the weekend.    VS: Reviewed,  BMI of 38.07, otherwise within normal limits    Progress Toward Goals: Continued improvement    Psychiatric Review of Systems:  Behavior over the last 24 hours: improved  Sleep: improving  Appetite: improving  Medication side effects: none verbalized  Medical ROS:  No cough, chest pain, nausea, vomiting, diarrhea, all other symptoms are negative.    Vital signs in last 24 hours:  Temp:  [97.6 °F (36.4 °C)-98.3 °F (36.8 °C)] 97.6 °F (36.4 °C)  HR:  [] 103  Resp:  [16] 16  BP: (122-129)/(74-86) 122/74    Mental Status Exam:    Appearance:  alert, good eye contact, appears stated age, casually dressed, appropriate grooming and hygiene, obese, and wearing glasses   Behavior:  calm, cooperative, and sitting comfortably, restless at times   Speech:  spontaneous, clear, normal rate, normal volume, and coherent   Mood:  \"A little better today\"   Affect:  constricted, anxious   Thought Process:  generally linear and goal-directed but perseverative at " times   Associations: intact associations   Thought Content:  no verbalized delusions or overt paranoia   Perceptual Disturbances: denies current hallucinations and does not appear to be responding to internal stimuli at this time   Risk Potential: Suicidal ideation - None at present  Homicidal ideation - None at present  Potential for aggression - Not at present   Sensorium:  oriented to person, place, time/date, and situation   Memory:  recent and remote memory grossly intact   Consciousness:  alert and awake   Attention/Concentration: attention span and concentration are age appropriate   Insight:  partial   Judgment: partial   Gait/Station: normal gait/station   Motor Activity: no abnormal movements     Current Medications:  Current Facility-Administered Medications   Medication Dose Route Frequency Provider Last Rate    acetaminophen  650 mg Oral Q6H PRN Sade Hall MD      acetaminophen  650 mg Oral Q4H PRN Sade Hall MD      acetaminophen  975 mg Oral Q6H PRN Sade Hall MD      albuterol  2 puff Inhalation Q4H PRN STACIE Tinsley      aluminum-magnesium hydroxide-simethicone  30 mL Oral Q4H PRN Sade Hall MD      haloperidol lactate  2.5 mg Intramuscular Q4H PRN Max 4/day Sade Hall MD      And    LORazepam  1 mg Intramuscular Q4H PRN Max 4/day Sade Hall MD      And    benztropine  0.5 mg Intramuscular Q4H PRN Max 4/day Sade Hall MD      haloperidol lactate  5 mg Intramuscular Q4H PRN Max 4/day Sade Hall MD      And    LORazepam  2 mg Intramuscular Q4H PRN Max 4/day Sade Hall MD      And    benztropine  1 mg Intramuscular Q4H PRN Max 4/day Sade Hall MD      benztropine  1 mg Oral Q4H PRN Max 6/day Sade Hall MD      bisacodyl  10 mg Rectal Daily PRN Sade Hall MD      Cholecalciferol  2,000 Units Oral Daily STACIE Trejo      cyanocobalamin  1,000 mcg Oral Daily STACIE Trejo       hydrOXYzine HCL  50 mg Oral Q6H PRN Max 4/day Sade Hall MD      Or    diphenhydrAMINE  50 mg Intramuscular Q6H PRN Sade Hall MD      gabapentin  200 mg Oral TID David Linn, DO      [START ON 5/4/2024] gabapentin  300 mg Oral TID David Linn, DO      haloperidol  1 mg Oral Q6H PRN Sade Hall MD      haloperidol  2.5 mg Oral Q4H PRN Max 4/day Sade Hall MD      haloperidol  5 mg Oral Q4H PRN Max 4/day Sade Hall MD      hydrOXYzine HCL  100 mg Oral Q6H PRN Max 4/day Sade Hall MD      Or    LORazepam  2 mg Intramuscular Q6H PRN Sade Hall MD      hydrOXYzine HCL  25 mg Oral Q6H PRN Max 4/day Sade Hall MD      hydrOXYzine HCL  50 mg Oral BID David Linn, DO      loratadine  10 mg Oral Daily PRN STACIE Tinsley      lurasidone  80 mg Oral Daily With Dinner David Linn, DO      polyethylene glycol  17 g Oral Daily PRN Sade Hall MD      senna-docusate sodium  1 tablet Oral Daily PRN Sade Hall MD      sertraline  50 mg Oral Daily David Linn, DO      traZODone  50 mg Oral HS PRN Sade Hall MD      traZODone  50 mg Oral HS Han Sebastian MD         Behavioral Health Medications: all current active meds have been reviewed. Changes as in plan section above.    Laboratory results:  I have personally reviewed all pertinent laboratory/tests results.   Recent Results (from the past 48 hour(s))   ECG 12 lead    Collection Time: 05/01/24 11:26 AM   Result Value Ref Range    Ventricular Rate 75 BPM    Atrial Rate 75 BPM    [place] Interval 120 ms    QRSD Interval 80 ms    QT Interval 390 ms    QTC Interval 435 ms    P [place] 32 degrees    QRS Axis 52 degrees    T Wave Axis 17 degrees   UA w Reflex to Microscopic w Reflex to Culture    Collection Time: 05/01/24 12:15 PM    Specimen: Urine, Clean Catch   Result Value Ref Range    Color, UA Yellow Straw, Yellow, Pale Yellow    Clarity, UA Slightly Cloudy (A) Clear, Other    Specific  Gravity, UA 1.015 1.003 - 1.040    pH, UA 7.0 4.5, 5.0, 5.5, 6.0, 6.5, 7.0, 7.5, 8.0    Leukocytes, .0 (A) Negative    Nitrite, UA Negative Negative    Protein, UA Negative Negative mg/dl    Glucose, UA Negative Negative mg/dl    Ketones, UA Negative Negative mg/dl    Bilirubin, UA Negative Negative    Occult Blood, UA Negative Negative    UROBILINOGEN UA Negative 1.0, Negative mg/dL   Urine Microscopic    Collection Time: 05/01/24 12:15 PM   Result Value Ref Range    RBC, UA None Seen None Seen, 0-1, 1-2, 2-4, 0-5 /hpf    WBC, UA 4-10 (A) None Seen, 0-1, 1-2, 0-5, 2-4 /hpf    Epithelial Cells Moderate (A) None Seen, Occasional /hpf    Bacteria, UA Moderate (A) None Seen, Occasional /hpf    AMORPH PHOSPATES Moderate /hpf    MUCUS THREADS Moderate (A) None Seen        This note has been constructed using a voice recognition system. There may be translation, syntax, or grammatical errors. If you have any questions, please contact the dictating author.    David Linn, DO  Psychiatry Residency, PGY-2

## 2024-05-03 NOTE — PROGRESS NOTES
05/03/24 1400   Activity/Group Checklist   Group Other (Comment)  (Group Art Therapy/Psychodynamic, Explore Internal vs. External Space with Process Discussion)   Attendance Attended   Attendance Duration (min) Greater than 60  (90 min group)   Interactions Interacted appropriately   Affect/Mood Appropriate   Goals Achieved Discussed coping strategies;Increased hopefulness;Able to listen to others;Able to engage in interactions;Able to recieve feedback;Able to give feedback to another;Able to experience relief/decrease in symptoms;Able to manage/cope with feelings

## 2024-05-03 NOTE — BH TRANSITION RECORD
Contact Information: If you have any questions, concerns, pended studies, tests and/or procedures, or emergencies regarding your inpatient behavioral health visit. Please contact [facility] [phone] and ask to speak to a , nurse or physician. A contact is available 24 hours/ 7 days a week at this number.     Summary of Procedures Performed During your Stay:  Below is a list of major procedures performed during your hospital stay and a summary of results:  - Cardiac Procedures/Studies: ECG.    ECG 12 lead  Order: [id number]   Status: Final result       Visible to patient: Yes (not seen)       Next appt: None    0 Result Notes         Component  Ref Range & Units 5/1/24 1126 4/30/24 1819 4/30/24 1816 12/9/16 1128   Ventricular Rate  BPM 75 91 90 85   Atrial Rate  BPM 75 91 90 85   FL Interval  ms 120 142 126 124   QRSD Interval  ms 80 82 82 80   QT Interval  ms 390 366 370 390   QTC Interval  ms 435 450 452 464   P Axis  degrees 32 41 39 58   QRS Axis  degrees 52 57 55 80   T Wave Axis  degrees 17 23 14 40              Narrative & Impression    Normal sinus rhythm  Normal ECG  When compared with ECG of 30-APR-2024 18:19, (unconfirmed)  No significant change was found             Pending Studies (From admission, onward)      None          Please follow up on the above pending studies with your PCP and/or referring provider.

## 2024-05-03 NOTE — PROGRESS NOTES
05/02/24 1400   Activity/Group Checklist   Group Other (Comment)  (Group Art Therapy/Psychodynamic, Adapting to Change with Process Discussion)   Attendance Attended   Attendance Duration (min) Greater than 60  (90 min group)   Interactions Interacted appropriately   Affect/Mood Appropriate   Goals Achieved Discussed coping strategies;Able to listen to others;Able to engage in interactions;Able to recieve feedback;Able to give feedback to another;Increased hopefulness;Able to experience relief/decrease in symptoms

## 2024-05-03 NOTE — DISCHARGE SUMMARY
"Discharge Summary - Behavioral Health   Tiffany Valle 33 y.o. female MRN: [id number]  Unit/Bed#: -01 Encounter: [id number]     Admission Date: 05/01/2024  Admission Orders (From admission, onward)       Ordered        04/30/24 1125  ED TO DIFFERENT CAMPUS [facility] UNIT or INPATIENT MEDICAL UNIT to [facility] UNIT (using Discharge Readmit Navigator) - Admit Patient to IP Behavioral Health Unit  Once                              Discharge Date: ***    Attending Psychiatrist: Mouna Padilla MD    Reason for Admission:   Tiffany Valle is a 33 y.o. female, admitted to the inpatient behavioral health unit at [facility] [address] , as a voluntarily 201 commitment, subsequent to worsening depression and anxiety, panic attacks, and suicidal ideation with a plan to slit her wrists in the bathtub. Tiffany reported worsening depression, suicidal ideation, hopelessness, poor concentration, anxiety attacks, flashbacks, insomnia, weight gain, and difficulty attending to ADL's. Please refer to the initial H&P below for full details.    See below H&P from David Linn DO on 05/01/2024 :  \"Tiffany is a 33 y.o. female with no pertinent past medical history and pertinent past psychiatric history of bipolar 2 disorder, most recent episode depressed without psychotic features, premenstrual dysphoric disorder, PTSD and generalized anxiety disorder with panic attacks who presents voluntarily on a 201 commitment with worsening symptoms of anxiety and depression, panic attacks, and suicidal ideation with a plan to slit her wrists in the bathtub.     Symptoms prior to admission included worsening depression, suicidal ideation, hopelessness, poor concentration, difficulty sleeping, weight gain, increased irritability, anxiety symptoms, anxiety attacks, flashbacks, and difficulty attending to activities of daily living. Symptom began gradual starting several weeks ago with gradually worsening course since that " "time. Stressors preceding admission included recent medication change, everyday stressors, ongoing anxiety, chronic mental illness, and difficulty with maintaining healthy weight. Please see documentation from the ED/Crisis/Consulting physician for further details about initial presentation.     On initial evaluation, Tiffany states she has been struggling with generalized anxiety disorder with panic attacks in addition to depression dating back to high school following traumatic events in childhood and states she has been seen in outpatient psychiatrist for the past 2+ years with a recent change in provider due to care home of previous provider, and endorses multiple stressors accumulating over the past month or 2 including starting a new job, and every day stressors including taking care of her 3-year-old son. She states over the past months she has been having panic attacks on almost a daily basis at work and frequently becomes excessively worried over day-to-day activities including leaving the home and driving and taking care of obligations relating to her son.  She also endorses flashbacks to events in her childhood that are consistent with PTSD symptomatology, decreased concentration, increased appetite, decreased sleep, and feeling hopeless.  She states her anxiety became so bad over the past 2 weeks that she started to develop passive suicidal ideation which she has experienced in the past and is minimizing with regards to suicidal ideation prior to admission as she reported a plan to ED staff but denies this to this writer.  She states she also carries a diagnosis of bipolar disorder though she is unsure if this is true, at which time she endorses a history of mood swings and describes her \"ups\" as lasting days to a week with increased goal oriented activity level, feeling energized, spending a lot of money and other impulsive activities but denying any need for previous psychiatric hospitalization during " "these episodes which may be consistent with hypomania.  She denies any disordered eating habits or body dysmorphia but states she tends to eat excessively when she is worried and depressed and endorses struggling with her weight maintenance.  She does endorse 1 previous suicide attempt in her teens at which time she tried to hang herself but was interrupted by her mother incidentally and states she is glad that she was not able to complete this act.  She denies any current recreational substance use and denies any previous history of TBI or withdrawal seizures or any symptoms consistent with psychosis.  Risks and benefits of the proposed above medication recommendations were reviewed with the patient at which time she expressed understanding and was amenable to proposed changes.\"    Hospital Course:   The patient was admitted to the inpatient psychiatric unit and started on behavioral health checks every 15 minutes per unit protocol. Upon admission, the patient was evaluated by the medical service for medical clearance and further management of medical issues as needed. During hospitalization, Tiffany Valle was encouraged to participate in group therapy, milieu therapy, and occupational therapy. Initially, she was started on Zoloft and Atarax to address symptoms of anxiety, depression and PTSD.  Possible side effects were discussed with the patient prior to initiation, and she verbalized understanding. Medications were appropriately titrated to ***.     The patient adhered to her medication regimen and denied any acute adverse effects not otherwise mentioned. Her symptoms began to improve, and her affect brightened throughout  the course of psychiatric management. She reported improvements in sleep, appetite, ***. She was seen in [facility] interacting appropriately with peers and ***participating in group therapy. Tiffany did not demonstrate dangerous behavior to self or peers during her inpatient stay. As she " "demonstrated improvement, the treatment team agreed she had maximally benefited from inpatient treatment and felt she could be safely discharged with plan to continue outpatient treatment.    At the time of discharge, Tiffany reports feeling ***. She denied suicidal and homicidal ideations at the time of discharge, as well as auditory and visual hallucinations. *** Her goals are to ***. Applicable follow up and safety plan was reviewed with the patient prior to discharge.    Risk of Harm to Self:    The following ratings are based on {RISK ASSESSMENT:27538::\"assessment at the time of discharge\"}  Demographic risk factors include: {EFO AMB DEMO RISK FACTORS:91762}  Historical Risk Factors include: {EFO AMB HX RISK FACTORS:47621::\"none\"}  Current Specific Risk Factors include: {EFO INP RECENT RISK FCTRS on Discharge:18207::\"recent inpatient psychiatric admission - being discharged today\"}  Protective Factors: {EFO Protective Factors Suicide Inpatient on Discharge:63729::\"no current suicidal ideation\"}  Weapons/Firearms: {EFO Risk Weapons:47097::\"none\"}. The following steps have been taken to ensure weapons are properly secured: {secure weapon:45489::\"not applicable\"}  Based on today's assessment, Tiffany presents the following risk of harm to self: {EFO Suicide Risk Inpatient:92645}    Risk of Harm to Others:  The following ratings are based on {RISK ASSESSMENT:92503::\"assessment at the time of discharge\"}  Demographic Risk Factors include: {EFO AMB RISK HARM DEMO:93424::\"none\"}.  Historical Risk Factors include: {EFO AMB RISK HARM HX:79936::\"none\"}.  Current Specific Risk Factors include: {EFO INP Harm Recent on Discharge:71675::\"none\"}  Protective Factors: {EFO Protective Factors Inp Homicide on Discharge:20596::\"no current homicidal ideation\"}  Weapons/Firearms: {EFO Risk Weapons:79584::\"none\"}. The following steps have been taken to ensure weapons are properly secured: {secure weapon:31575::\"not " "applicable\"}  Based on today's assessment, Tiffany presents the following risk of harm to others: {O SUICIDE RISK:62038}     Discharge Psychiatric Medications:    ***    Other home medications for medical conditions were continued throughout hospitalization, if applicable. See AVS for more details.    Follow ups:    The patient is scheduled for follow up at:    ***    Behavioral Health Medications: { IP Behavioral Health Medications:30448}.  {Discharge on Two Antipsychotic Medications (Optional):90528}    Labs/Imaging:   {EFO Progress Note Labs:89306::\"I have personally reviewed all pertinent laboratory/tests results.\"}    Mental Status at time of Discharge:   Appearance:  {[facility] AMB EXAM; GENERAL PSYCH:62483::\"age appropriate\",\"dressed appropriately\",\"looks stated age\"}  { AMB PSYCH MENTAL STATUS APPEARANCE:72542::\"sitting comfortably in chair\",\"adequate hygiene and grooming\",\"cooperative with interview\",\"good eye contact \"}   Behavior:  {desc; mse behavior:04067::\"cooperative\"}   Speech:  {O  IP findings; speech:96179:a:\"normal rate\",\"normal volume\",\"normal pitch\",\"fluent\",\"clear\",\"coherent\"}   Mood:  {mood:89889}   Affect:  {DESC; AFFECT:05809::\"mood-congruent\"}   Language { AMB Language:63089::\"Within normal limits\"}   Thought Process:  {O AMB THOUGHT PROCESS:53985::\"organized\",\"logical\",\"goal directed\",\"normal rate of thoughts\"}   Thought Content:  {DC Thought Content:49584::\"No verbalized delusions\"}   Perceptual Disturbances: {JA-MSE-PD:75209}   Risk Potential: { MSE RISK:93006}   Sensorium:  { ip slp orientation:84053::\"person\",\"place\",\"time\",\"current situation\"}   Cognition:  {DC cognition:30582::\"Grossly intact\"}   Consciousness:  { IP Exam; psych consciousness:36600::\"alert\",\"awake\"}   Attention: {HL ATTENTION:40874::\"attention span and concentration were age appropriate\"}   Insight:  {insight/judgement:70298::\"fair\"}   Judgment: {insight/judgement:59079::\"fair\"}   Intellect {EFO AMB " "INTELLECTUAL FUNC:86438::\"appears to be of average intelligence\"}   Gait/Station: {SL IP  Gait/Station:84359::\"normal gait/station\"}   Motor Activity: {HL Motor Activity:35840::\"no abnormal movements\"}     Discharge Diagnosis:   Patient Active Problem List   Diagnosis    Medical clearance for psychiatric admission    Obesity (BMI 35.0-39.9 without comorbidity)    Bipolar 2 disorder, major depressive episode (HCC)    Endometriosis determined by laparoscopy    Fatigue    Iron deficiency    Mild exercise-induced asthma    Palpitations    Post-traumatic stress disorder, chronic    Panic disorder without agoraphobia    Pelvic pain in female    PMDD (premenstrual dysphoric disorder)    Vitamin D deficiency    SOCO (generalized anxiety disorder)       Discharge Medications:  See list above, as well as the after visit summary containing reconciled discharge medications provided to patient and family.      Discharge instructions/Information to patient and family:   See after visit summary for information provided to patient and family.      Provisions for Follow-Up Care:  See after visit summary for information related to follow-up care and any pertinent home health orders.      This note has been constructed using a voice recognition system. There may be translation, syntax,  or grammatical errors. If you have any questions, please contact the dictating provider.    David Linn, DO  PGY-***    "

## 2024-05-04 PROCEDURE — 99232 SBSQ HOSP IP/OBS MODERATE 35: CPT | Performed by: STUDENT IN AN ORGANIZED HEALTH CARE EDUCATION/TRAINING PROGRAM

## 2024-05-04 RX ADMIN — HYDROXYZINE HYDROCHLORIDE 50 MG: 50 TABLET, FILM COATED ORAL at 17:35

## 2024-05-04 RX ADMIN — GABAPENTIN 300 MG: 300 CAPSULE ORAL at 08:50

## 2024-05-04 RX ADMIN — GABAPENTIN 300 MG: 300 CAPSULE ORAL at 21:12

## 2024-05-04 RX ADMIN — SERTRALINE HYDROCHLORIDE 50 MG: 50 TABLET ORAL at 08:50

## 2024-05-04 RX ADMIN — Medication 2000 UNITS: at 08:50

## 2024-05-04 RX ADMIN — HYDROXYZINE HYDROCHLORIDE 50 MG: 50 TABLET, FILM COATED ORAL at 08:50

## 2024-05-04 RX ADMIN — TRAZODONE HYDROCHLORIDE 50 MG: 50 TABLET ORAL at 21:12

## 2024-05-04 RX ADMIN — GABAPENTIN 300 MG: 300 CAPSULE ORAL at 17:35

## 2024-05-04 RX ADMIN — LURASIDONE HYDROCHLORIDE 80 MG: 80 TABLET, COATED ORAL at 17:35

## 2024-05-04 RX ADMIN — CYANOCOBALAMIN TAB 1000 MCG 1000 MCG: 1000 TAB at 08:50

## 2024-05-04 NOTE — NURSING NOTE
Patient calm and cooperative, medication compliant. Patient denies psychiatric symptoms at this time, denies any unmet needs.    Patient was isolative to room for most of the evening, however was pleasant upon approach and was appropriate in speech and appeared calm.

## 2024-05-04 NOTE — NURSING NOTE
"Tiffany is calm, pleasant, and cooperative reporting mild severity of depression and anxiety. She reports anxiety as worsening over the past several months contributing to depressed mood and SI saying \"the anxiety just got so bad I wanted it to go away.\" She feels the medication is helping thus far and finds it comforting to speak with her  on the phone. She denies SI/HI/AVH. She is visible on the milieu socializing with peers. No unmet needs currently.   "

## 2024-05-04 NOTE — NURSING NOTE
Patient calm and cooperative, medication compliant. Patient denies psychiatric symptoms at this time, denies any unmet needs.

## 2024-05-04 NOTE — PROGRESS NOTES
"Progress Note - Behavioral Health   Tiffany Valle 33 y.o. female MRN: [id number]  Unit/Bed#: U 385-01 Encounter: [id number]    Assessment/Plan   Principal Problem:    Bipolar 2 disorder, major depressive episode (HCC)  Active Problems:    Medical clearance for psychiatric admission    Obesity (BMI 35.0-39.9 without comorbidity)    Post-traumatic stress disorder, chronic    Panic disorder without agoraphobia    PMDD (premenstrual dysphoric disorder)    SOCO (generalized anxiety disorder)    Recommended Treatment:   Continue current medications.  Group, individual, milieu therapy.  Continue behavioral health checks per routine.  Medical management per SLIM.  Discharge planning    ----------------------------------------      Subjective: Per nursing report patient reported to be calm and cooperative.  Pleasant in the milieu.  Interacting with staff and peers.  Attending group activities.    Reports that her mood today is \"better\".  She reports that anxiety is less and feels that the gabapentin has been helpful.  Denies any negative side effects to the medications thus far.  Reports that she had some difficulty sleeping last night although when she was able to fall asleep she felt rested.  Energy levels today are good.  Appetite level continues to improve.  She states that she is looking forward to speaking with her  today.  Denies any SI, HI, AVH.    Behavior over the last 24 hours:  unchanged  Sleep: decreased  Appetite: normal  Medication side effects: No  ROS: no complaints and all other systems are negative    Mental Status Evaluation:  Appearance:  casually dressed, adequate grooming   Behavior:  pleasant, cooperative, calm   Speech:  normal rate and volume   Mood:  euthymic   Affect:  constricted   Thought Process:  organized, logical, coherent, goal directed   Associations: intact associations   Thought Content:  no overt delusions   Perceptual Disturbances: no auditory hallucinations, no visual " hallucinations, does not appear responding to internal stimuli   Risk Potential: Suicidal ideation - None at present  Homicidal ideation - None at present  Potential for aggression - No   Sensorium:  oriented to person, place, and time/date   Memory:  recent and remote memory grossly intact   Consciousness:  alert and awake   Attention/Concentration: attention span and concentration are age appropriate   Insight:  fair   Judgment: fair   Gait/Station: normal gait/station   Motor Activity: no abnormal movements     Medications: all current active meds have been reviewed and continue current psychiatric medications.  Current Facility-Administered Medications   Medication Dose Route Frequency Provider Last Rate    acetaminophen  650 mg Oral Q6H PRN Sade Hall MD      acetaminophen  650 mg Oral Q4H PRN Sade Hall MD      acetaminophen  975 mg Oral Q6H PRN Sade Hall MD      albuterol  2 puff Inhalation Q4H PRN STACIE Tinsley      aluminum-magnesium hydroxide-simethicone  30 mL Oral Q4H PRN Sade Hall MD      haloperidol lactate  2.5 mg Intramuscular Q4H PRN Max 4/day Sade Hall MD      And    LORazepam  1 mg Intramuscular Q4H PRN Max 4/day Sade Hall MD      And    benztropine  0.5 mg Intramuscular Q4H PRN Max 4/day Sade Hall MD      haloperidol lactate  5 mg Intramuscular Q4H PRN Max 4/day Sade Hall MD      And    LORazepam  2 mg Intramuscular Q4H PRN Max 4/day Sade Hall MD      And    benztropine  1 mg Intramuscular Q4H PRN Max 4/day Sade Hall MD      benztropine  1 mg Oral Q4H PRN Max 6/day Sade Hall MD      bisacodyl  10 mg Rectal Daily PRN Sade Hall MD      Cholecalciferol  2,000 Units Oral Daily STACIE Trejo      cyanocobalamin  1,000 mcg Oral Daily STACIE Trejo      hydrOXYzine HCL  50 mg Oral Q6H PRN Max 4/day Sade Hall MD      Or    diphenhydrAMINE  50 mg Intramuscular Q6H  PRN Sade Hall MD      gabapentin  300 mg Oral TID David Linn, DO      haloperidol  1 mg Oral Q6H PRN Sade Hall MD      haloperidol  2.5 mg Oral Q4H PRN Max 4/day Sade Hall MD      haloperidol  5 mg Oral Q4H PRN Max 4/day Sade Hall MD      hydrOXYzine HCL  100 mg Oral Q6H PRN Max 4/day Sade Hall MD      Or    LORazepam  2 mg Intramuscular Q6H PRN Sade Hall MD      hydrOXYzine HCL  25 mg Oral Q6H PRN Max 4/day Sade Hall MD      hydrOXYzine HCL  50 mg Oral BID David Linn, DO      loratadine  10 mg Oral Daily PRN STAICE Tinsley      lurasidone  80 mg Oral Daily With Dinner David Linn, DO      polyethylene glycol  17 g Oral Daily PRN Sade Hall MD      senna-docusate sodium  1 tablet Oral Daily PRN Sade Hall MD      sertraline  50 mg Oral Daily David Linn, DO      traZODone  50 mg Oral HS PRN Sade Hall MD      traZODone  50 mg Oral HS Han Sebastian MD         Labs: I have personally reviewed all pertinent laboratory/tests results  Results from the past 24 hours: No results found for this or any previous visit (from the past 24 hour(s)).    Progress Toward Goals: progressing    Risks / Benefits of Treatment:    Risks, benefits, and possible side effects of medications explained to patient and patient verbalizes understanding and agreement for treatment.    Counseling / Coordination of Care:    Total floor / unit time spent today 35 minutes. Greater than 50% of total time was spent with the patient and / or family counseling and / or coordination of care. A description of counseling / coordination of care:  Patient's progress discussed with staff in treatment team meeting.  Medications, treatment progress and treatment plan reviewed with patient.  Reassurance and supportive therapy provided.  Encouraged participation in milieu and group therapy on the unit.    Han Sebastian MD 05/04/24

## 2024-05-04 NOTE — PLAN OF CARE
Problem: Ineffective Coping  Goal: Cooperates with admission process  Description: Interventions:   - Complete admission process  Outcome: Progressing  Goal: Identifies ineffective coping skills  Outcome: Progressing  Goal: Identifies healthy coping skills  Outcome: Progressing  Goal: Demonstrates healthy coping skills  Outcome: Progressing  Goal: Patient/Family verbalizes awareness of resources  Outcome: Progressing  Goal: Understands least restrictive measures  Description: Interventions:  - Utilize least restrictive behavior  Outcome: Progressing  Goal: Free from restraint events  Description: - Utilize least restrictive measures   - Provide behavioral interventions   - Redirect inappropriate behaviors   Outcome: Progressing     Problem: Depression  Goal: Treatment Goal: Demonstrate behavioral control of depressive symptoms, verbalize feelings of improved mood/affect, and adopt new coping skills prior to discharge  Outcome: Progressing  Goal: Refrain from harming self  Description: Interventions:  - Monitor patient closely, per order   - Supervise medication ingestion, monitor effects and side effects   Outcome: Progressing  Goal: Refrain from isolation  Description: Interventions:  - Develop a trusting relationship   - Encourage socialization   Outcome: Progressing  Goal: Refrain from self-neglect  Outcome: Progressing     Problem: Anxiety  Goal: Anxiety is at manageable level  Description: Interventions:  - Assess and monitor patient's anxiety level.   - Monitor for signs and symptoms (heart palpitations, chest pain, shortness of breath, headaches, nausea, feeling jumpy, restlessness, irritable, apprehensive).   - Collaborate with interdisciplinary team and initiate plan and interventions as ordered.  - [place] patient to unit/surroundings  - Explain treatment plan  - Encourage participation in care  - Encourage verbalization of concerns/fears  - Identify coping mechanisms  - Assist in developing  anxiety-reducing skills  - Administer/offer alternative therapies  - Limit or eliminate stimulants  Outcome: Progressing

## 2024-05-05 PROCEDURE — 99232 SBSQ HOSP IP/OBS MODERATE 35: CPT | Performed by: STUDENT IN AN ORGANIZED HEALTH CARE EDUCATION/TRAINING PROGRAM

## 2024-05-05 RX ADMIN — HYDROXYZINE HYDROCHLORIDE 50 MG: 50 TABLET, FILM COATED ORAL at 08:47

## 2024-05-05 RX ADMIN — GABAPENTIN 300 MG: 300 CAPSULE ORAL at 08:47

## 2024-05-05 RX ADMIN — Medication 2000 UNITS: at 08:47

## 2024-05-05 RX ADMIN — SERTRALINE HYDROCHLORIDE 50 MG: 50 TABLET ORAL at 08:48

## 2024-05-05 RX ADMIN — CYANOCOBALAMIN TAB 1000 MCG 1000 MCG: 1000 TAB at 08:48

## 2024-05-05 RX ADMIN — GABAPENTIN 300 MG: 300 CAPSULE ORAL at 21:12

## 2024-05-05 RX ADMIN — TRAZODONE HYDROCHLORIDE 50 MG: 50 TABLET ORAL at 21:12

## 2024-05-05 RX ADMIN — LURASIDONE HYDROCHLORIDE 80 MG: 80 TABLET, COATED ORAL at 17:46

## 2024-05-05 RX ADMIN — HYDROXYZINE HYDROCHLORIDE 50 MG: 50 TABLET, FILM COATED ORAL at 17:46

## 2024-05-05 RX ADMIN — GABAPENTIN 300 MG: 300 CAPSULE ORAL at 16:09

## 2024-05-05 NOTE — PROGRESS NOTES
"Progress Note - Behavioral Health   Tiffany Valle 33 y.o. female MRN: [id number]  Unit/Bed#: U 385-01 Encounter: [id number]    Assessment/Plan   Principal Problem:    Bipolar 2 disorder, major depressive episode (HCC)  Active Problems:    Medical clearance for psychiatric admission    Obesity (BMI 35.0-39.9 without comorbidity)    Post-traumatic stress disorder, chronic    Panic disorder without agoraphobia    PMDD (premenstrual dysphoric disorder)    SOCO (generalized anxiety disorder)    Recommended Treatment:   Continue current medications.  Group, individual, milieu therapy.  Continue behavioral health checks per routine.  Medical management per SLIM.  Discharge planning    ----------------------------------------      Subjective: Per nursing report patient has been attending group and milieu activities.  Noted to be social with peers although was a bit more isolative to her room in the evening time.    Reports today that her mood is \"pretty good\".  She reports feeling better.  Notes that her anxiety has lessened and that she is not feeling as hopeless or down.  She is feeling more future oriented and goal directed, expressing interest in being able to see her daughter soon.  Reports that she slept better last night, feeling more rested and better having improved energy levels.  She intends today to shower and attend more group activities and interact with some peers that she has been getting along with.  Denies any SI, HI, AVH today.    Behavior over the last 24 hours:  unchanged  Sleep: normal  Appetite: normal  Medication side effects: No  ROS: no complaints and all other systems are negative    Mental Status Evaluation:  Appearance:  casually dressed, adequate grooming   Behavior:  pleasant, cooperative, calm   Speech:  normal rate and volume   Mood:  euthymic   Affect:  slightly brighter   Thought Process:  organized, logical, coherent, goal directed   Associations: intact associations   Thought Content:  " no overt delusions   Perceptual Disturbances: no auditory hallucinations, no visual hallucinations, does not appear responding to internal stimuli   Risk Potential: Suicidal ideation - None at present  Homicidal ideation - None at present  Potential for aggression - No   Sensorium:  oriented to person, place, and time/date   Memory:  recent and remote memory grossly intact   Consciousness:  alert and awake   Attention/Concentration: attention span and concentration are age appropriate   Insight:  fair   Judgment: fair   Gait/Station: normal gait/station   Motor Activity: no abnormal movements     Medications: all current active meds have been reviewed and continue current psychiatric medications.  Current Facility-Administered Medications   Medication Dose Route Frequency Provider Last Rate    acetaminophen  650 mg Oral Q6H PRN Sade Hall MD      acetaminophen  650 mg Oral Q4H PRN Sade Hall MD      acetaminophen  975 mg Oral Q6H PRN Sade Hall MD      albuterol  2 puff Inhalation Q4H PRN STACIE Tinsley      aluminum-magnesium hydroxide-simethicone  30 mL Oral Q4H PRN Sade Hall MD      haloperidol lactate  2.5 mg Intramuscular Q4H PRN Max 4/day Sade Hall MD      And    LORazepam  1 mg Intramuscular Q4H PRN Max 4/day Sade Hall MD      And    benztropine  0.5 mg Intramuscular Q4H PRN Max 4/day Sade Hall MD      haloperidol lactate  5 mg Intramuscular Q4H PRN Max 4/day Sade Hall MD      And    LORazepam  2 mg Intramuscular Q4H PRN Max 4/day Sade Hall MD      And    benztropine  1 mg Intramuscular Q4H PRN Max 4/day Sade Hall MD      benztropine  1 mg Oral Q4H PRN Max 6/day Sade Hall MD      bisacodyl  10 mg Rectal Daily PRN Sade Hall MD      Cholecalciferol  2,000 Units Oral Daily STACIE Trejo      cyanocobalamin  1,000 mcg Oral Daily STACIE Terjo      hydrOXYzine HCL  50 mg Oral Q6H  PRN Max 4/day Sade Hall MD      Or    diphenhydrAMINE  50 mg Intramuscular Q6H PRN Sade Hall MD      gabapentin  300 mg Oral TID David Linn, DO      haloperidol  1 mg Oral Q6H PRN Sade Hall MD      haloperidol  2.5 mg Oral Q4H PRN Max 4/day Sade Hall MD      haloperidol  5 mg Oral Q4H PRN Max 4/day Sade Hall MD      hydrOXYzine HCL  100 mg Oral Q6H PRN Max 4/day Sade aHll MD      Or    LORazepam  2 mg Intramuscular Q6H PRN Sade Hall MD      hydrOXYzine HCL  25 mg Oral Q6H PRN Max 4/day Sade Hall MD      hydrOXYzine HCL  50 mg Oral BID David Linn, DO      loratadine  10 mg Oral Daily PRN STACIE Tinsley      lurasidone  80 mg Oral Daily With Dinner David Linn, DO      polyethylene glycol  17 g Oral Daily PRN Sade Hall MD      senna-docusate sodium  1 tablet Oral Daily PRN Sade Hall MD      sertraline  50 mg Oral Daily David Linn, DO      traZODone  50 mg Oral HS PRN Sade Hall MD      traZODone  50 mg Oral HS Han Sebastian MD         Labs: I have personally reviewed all pertinent laboratory/tests results  Most Recent Labs:   Lab Results   Component Value Date    WBC 7.74 04/30/2024    RBC 4.95 04/30/2024    HGB 13.8 04/30/2024    HCT 43.6 04/30/2024     04/30/2024    RDW 12.9 04/30/2024    NEUTROABS 3.74 04/30/2024    SODIUM 138 04/30/2024    K 3.7 04/30/2024     04/30/2024    CO2 23 04/30/2024    BUN 16 04/30/2024    CREATININE 1.10 04/30/2024    GLUC 121 04/30/2024    CALCIUM 9.2 04/30/2024    AST 14 04/30/2024    ALT 12 04/30/2024    ALKPHOS 70 04/30/2024    TP 7.6 04/30/2024    ALB 4.7 04/30/2024    TBILI 0.93 04/30/2024    CHOLESTEROL 178 04/30/2024    HDL 43 (L) 04/30/2024    TRIG 152 (H) 04/30/2024    LDLCALC 105 (H) 04/30/2024    NONHDLC 135 04/30/2024    DCG7FHMCXXET 1.177 04/30/2024    FREET4 0.71 11/28/2023    T3FREE 2.84 04/15/2021    PREGSERUM Negative 04/30/2024    HCGQUANT 530 (H)  09/07/2019    HGBA1C 5.3 04/30/2024     04/30/2024       Progress Toward Goals: progressing    Risks / Benefits of Treatment:    Risks, benefits, and possible side effects of medications explained to patient and patient verbalizes understanding and agreement for treatment.    Counseling / Coordination of Care:    Total floor / unit time spent today 25 minutes. Greater than 50% of total time was spent with the patient and / or family counseling and / or coordination of care. A description of counseling / coordination of care:  Patient's progress discussed with staff in treatment team meeting.  Medications, treatment progress and treatment plan reviewed with patient.  Reassurance and supportive therapy provided.  Encouraged participation in milieu and group therapy on the unit.    Han Sebastian MD 05/05/24

## 2024-05-05 NOTE — PLAN OF CARE
Problem: Ineffective Coping  Goal: Cooperates with admission process  Description: Interventions:   - Complete admission process  Outcome: Progressing  Goal: Identifies ineffective coping skills  Outcome: Progressing  Goal: Identifies healthy coping skills  Outcome: Progressing  Goal: Demonstrates healthy coping skills  Outcome: Progressing  Goal: Patient/Family verbalizes awareness of resources  Outcome: Progressing  Goal: Understands least restrictive measures  Description: Interventions:  - Utilize least restrictive behavior  Outcome: Progressing  Goal: Free from restraint events  Description: - Utilize least restrictive measures   - Provide behavioral interventions   - Redirect inappropriate behaviors   Outcome: Progressing     Problem: Depression  Goal: Treatment Goal: Demonstrate behavioral control of depressive symptoms, verbalize feelings of improved mood/affect, and adopt new coping skills prior to discharge  Outcome: Progressing  Goal: Refrain from harming self  Description: Interventions:  - Monitor patient closely, per order   - Supervise medication ingestion, monitor effects and side effects   Outcome: Progressing  Goal: Refrain from isolation  Description: Interventions:  - Develop a trusting relationship   - Encourage socialization   Outcome: Progressing  Goal: Refrain from self-neglect  Outcome: Progressing     Problem: Anxiety  Goal: Anxiety is at manageable level  Description: Interventions:  - Assess and monitor patient's anxiety level.   - Monitor for signs and symptoms (heart palpitations, chest pain, shortness of breath, headaches, nausea, feeling jumpy, restlessness, irritable, apprehensive).   - Collaborate with interdisciplinary team and initiate plan and interventions as ordered.  - [place] patient to unit/surroundings  - Explain treatment plan  - Encourage participation in care  - Encourage verbalization of concerns/fears  - Identify coping mechanisms  - Assist in developing  anxiety-reducing skills  - Administer/offer alternative therapies  - Limit or eliminate stimulants  Outcome: Progressing     Problem: DISCHARGE PLANNING  Goal: Discharge to home or other facility with appropriate resources  Description: INTERVENTIONS:  - Identify barriers to discharge w/patient and caregiver  - Arrange for needed discharge resources and transportation as appropriate  - Identify discharge learning needs (meds, wound care, etc.)  - Arrange for interpretive services to assist at discharge as needed  - Refer to Case Management Department for coordinating discharge planning if the patient needs post-hospital services based on physician/advanced practitioner order or complex needs related to functional status, cognitive ability, or social support system  Outcome: Progressing

## 2024-05-05 NOTE — NURSING NOTE
"Tiffany is calm with a euthymic affect and \"better\" mood. She reports low level of depression and anxiety saying \"it's a little better, I just needed a little boost being here.\" She feels the medication changes have been helpful and is looking forward to discharge. She denies SI/HI/AVH. She is visible on the milieu socializing with peers. No unmet needs currently.   "

## 2024-05-06 PROCEDURE — 99232 SBSQ HOSP IP/OBS MODERATE 35: CPT | Performed by: PSYCHIATRY & NEUROLOGY

## 2024-05-06 RX ADMIN — LURASIDONE HYDROCHLORIDE 80 MG: 80 TABLET, COATED ORAL at 17:25

## 2024-05-06 RX ADMIN — TRAZODONE HYDROCHLORIDE 50 MG: 50 TABLET ORAL at 21:09

## 2024-05-06 RX ADMIN — GABAPENTIN 300 MG: 300 CAPSULE ORAL at 16:54

## 2024-05-06 RX ADMIN — HYDROXYZINE HYDROCHLORIDE 50 MG: 50 TABLET, FILM COATED ORAL at 08:12

## 2024-05-06 RX ADMIN — GABAPENTIN 300 MG: 300 CAPSULE ORAL at 08:12

## 2024-05-06 RX ADMIN — CYANOCOBALAMIN TAB 1000 MCG 1000 MCG: 1000 TAB at 08:12

## 2024-05-06 RX ADMIN — HYDROXYZINE HYDROCHLORIDE 50 MG: 50 TABLET, FILM COATED ORAL at 17:25

## 2024-05-06 RX ADMIN — SERTRALINE HYDROCHLORIDE 50 MG: 50 TABLET ORAL at 08:13

## 2024-05-06 RX ADMIN — GABAPENTIN 300 MG: 300 CAPSULE ORAL at 21:10

## 2024-05-06 RX ADMIN — Medication 2000 UNITS: at 08:13

## 2024-05-06 NOTE — PROGRESS NOTES
05/06/24 0905   Team Meeting   Meeting Type Daily Rounds   Team Members Present   Team Members Present Physician;Nurse;   Physician Team Member Valerie   [facility] Dustin   Patient/Family Present   Patient Present No   Patient's Family Present No     201. Pt is med/meal complaint. Pt's affect has improved. Pt is calm and cooperative. Pt discharges tomorrow.    No

## 2024-05-06 NOTE — CASE MANAGEMENT
Phone call placed to pt's  Han Valle at [phone] to provide updates regarding discharge plan.  Pt does not have access to firearms and no concerns were reported during interaction. CM informed medications will send to preferred pharmacy. Pt will be picked up at 11 AM by  Han Valle.

## 2024-05-06 NOTE — PROGRESS NOTES
BEHAVIORAL HEALTH SERVICE PROGRESS NOTE    Tiffany Valle 33 y.o. female MRN: [id number]  Unit/Bed#: [facility] 385-02 Encounter: [id number]         ASSESSMENT/PLAN     Tiffany aVlle is a 33 y.o. female, admitted to the inpatient behavioral health unit at [facility] 3P, on 201 voluntary commitment, subsequent to depression, anxiety, and suicidal ideation with plan to cut wrists with a knife or electrocute herself with a hair dryer in bathtub.  On evaluation today, patient endorsed improvement in anxiety symptoms.  She denied SI, HI, AVH.  At this time, will plan for tentative discharge tomorrow. No anticipated psychopharmacologic changes at this juncture..    Principal Problem:    Bipolar 2 disorder, major depressive episode (HCC)  Active Problems:    Medical clearance for psychiatric admission    Obesity (BMI 35.0-39.9 without comorbidity)    Post-traumatic stress disorder, chronic    Panic disorder without agoraphobia    PMDD (premenstrual dysphoric disorder)    SOCO (generalized anxiety disorder)      RECOMMENDED TREATMENT     Continue current medications:  Vitamin D3 2000 units daily for supplementation  Vitamin B12 1000 mcg daily for supplementation  Atarax 50 mg twice daily for anxiety and panic attacks  Latuda 80 mg daily with dinner for mood stabilization  Zoloft 50 mg daily for depression, anxiety, and PTSD symptoms  Trazodone 50 mg at bedtime for insomnia  Gabapentin 300 mg 3 times daily for anxiety and panic attacks  All current active medications have been reviewed  Encourage group therapy, milieu therapy and occupational therapy  Behavioral Health checks every 15 minutes    Risks / Benefits of Treatment: Risks, benefits, and possible side effects of medications explained to patient and patient verbalizes understanding and agreement for treatment.  Counseling / Coordination of Care: Patient's progress discussed with staff in treatment team meeting.  Medications,  "treatment progress and treatment plan reviewed with patient.  Legal status: 201 voluntary commitment  Disposition: Projected date of discharge tomorrow,5/6/24         SUBJECTIVE     Over the last 24 hours:  Per nursing note: \" Patient calm and cooperative, medication compliant. Patient denies psychiatric symptoms at this time, denies any unmet needs. \"  PRN medications: None  Behavior over the last 24 hours: improving.     The patient was seen and evaluated today for continuity of care. On interview, the patient is sitting comfortably in chair.  Patient reports \"good\" mood.  Patient endorses \"improved\" energy levels. Patient reports \"pretty well\" sleep. Per patient, appetite is \" good\". Patient reports improvement in anxiety symptoms, endorsing that she has been focusing her time on practicing coping strategies such as \"deep breathing, grounding, alphabet game\".  She reports that she feels like she is prepared for discharge because she is better able to regulate her anxiety and is looking forward to practicing using her coping strategies when she is back at home. Patient reports 1/10 depression. Patient reports 2/10 anxiety. Per patient, most recent bowel movement was last night, which was per baseline. Currently, patient denies auditory hallucinations and denies visual hallucinations. The patient denies current passive or active suicidal ideation, intent, or plan. Patient denies current passive or active homicidal ideation, intent, or plan. Patient reports tolerating medications well and denies adverse effects at this time. Team discussed treatment plan, to which patient is amenable. Patient does not endorse additional questions or concerns at this time.  More visible on the unit. Interacts appropriately with peers.    Progress Toward Goals: improving, less anxious, less depressed    Review of Systems:  Sleep: normal  Appetite: normal  Medication side effects: No   ROS: reports no complaints, denies any headache, " "shortness of breath, chest pain, or abdominal pain, all other systems are negative      OBJECTIVE     Vital signs in last 24 hours:   Temp:  [97.9 °F (36.6 °C)-98.4 °F (36.9 °C)] 98.4 °F (36.9 °C)  HR:  [79-81] 81  Resp:  [16] 16  BP: (122-130)/(69-70) 130/70    Mental Status Exam:  Appearance: alert, appears stated age, casually dressed, and appropriate grooming and hygiene  Behavior: cooperative, mildly anxious  Motor: no abnormal movements  Gait/Station: normal gait/station, normal balance  Speech: normal rate, normal volume, normal pitch  Mood: \"good\"  Affect: constricted, slightly brighter  Thought process: goal directed, normal rate of thoughts  Associations: intact associations  Thought content: no overt delusions  Perceptual disturbances: no auditory hallucinations, no visual hallucinations  Risk potential:   Suicidal ideation - None at present  Homicidal ideation - None at present  Potential for aggression - Not at present  Cognition: memory grossly intact and appears to be of average intelligence  Sensorium: oriented to person, place, and time/date  Consciousness: alert and awake  Attention/Concentration: attention span and concentration are age appropriate  Insight: Limited  Judgment: Limited     Suicide/Homicide Risk Assessment:  Risk of Harm to Self:   Nursing Suicide Risk Assessment Last 24 hours: C-SSRS Risk (Since Last Contact)  Calculated C-SSRS Risk Score (Since Last Contact): No Risk Indicated  Current Specific Risk Factors include: diagnosis of mood disorder, mental illness diagnosis, current anxiety symptoms  Protective Factors: no current suicidal ideation, ability to communicate with staff on the unit, able to contract for safety on the unit, taking medications as ordered on the unit, improved depressive symptoms, improved anxiety symptoms  Based on today's assessment, Tiffany presents the following risk of harm to self: low  Risk of Harm to Others:  Nursing Homicide Risk Assessment: Violence " Risk to Others: Denies within past 6 months  Current Specific Risk Factors include: multiple stressors, social difficulties  Protective Factors: no current homicidal ideation, compliant with medications on the unit as ordered, compliant with unit milieu, follows staff redirection, willing to continue psychiatric treatment, good support system  Based on today's assessment, Tiffany presents the following risk of harm to others: low  The following interventions are recommended: behavioral checks every 7 minutes, continued hospitalization on locked unit      Nutrition Assessment and Intervention:     Reviewed food recall journal      Emotional and Mental Well-being, Sleep, Connectedness Assessment and Intervention:    Sleep/stress assessment performed           ACTIVE MEDICATIONS     Current Medications:  Current Facility-Administered Medications   Medication Dose Route Frequency Provider Last Rate    acetaminophen  650 mg Oral Q6H PRN Sade Hall MD      acetaminophen  650 mg Oral Q4H PRN Sade Hall MD      acetaminophen  975 mg Oral Q6H PRN Sade Hall MD      albuterol  2 puff Inhalation Q4H PRN STACIE Tinsley      aluminum-magnesium hydroxide-simethicone  30 mL Oral Q4H PRN Sade Hall MD      haloperidol lactate  2.5 mg Intramuscular Q4H PRN Max 4/day Sade Hall MD      And    LORazepam  1 mg Intramuscular Q4H PRN Max 4/day Sade Hall MD      And    benztropine  0.5 mg Intramuscular Q4H PRN Max 4/day Sade Hall MD      haloperidol lactate  5 mg Intramuscular Q4H PRN Max 4/day Sade Hall MD      And    LORazepam  2 mg Intramuscular Q4H PRN Max 4/day Sade Hall MD      And    benztropine  1 mg Intramuscular Q4H PRN Max 4/day Sade Hall MD      benztropine  1 mg Oral Q4H PRN Max 6/day Sade Hall MD      bisacodyl  10 mg Rectal Daily PRN Sade Hall MD      Cholecalciferol  2,000 Units Oral Daily STACIE Trejo       cyanocobalamin  1,000 mcg Oral Daily Glory STACIE Tay      hydrOXYzine HCL  50 mg Oral Q6H PRN Max 4/day Sade Hall MD      Or    diphenhydrAMINE  50 mg Intramuscular Q6H PRN Sade Hall MD      gabapentin  300 mg Oral TID David Linn, DO      haloperidol  1 mg Oral Q6H PRN Sade Hall MD      haloperidol  2.5 mg Oral Q4H PRN Max 4/day Sade Hall MD      haloperidol  5 mg Oral Q4H PRN Max 4/day Sade Hall MD      hydrOXYzine HCL  100 mg Oral Q6H PRN Max 4/day Sade Hall MD      Or    LORazepam  2 mg Intramuscular Q6H PRN Sade Hall MD      hydrOXYzine HCL  25 mg Oral Q6H PRN Max 4/day Sade Hall MD      hydrOXYzine HCL  50 mg Oral BID David Linn, DO      loratadine  10 mg Oral Daily PRN STACIE Tinsley      lurasidone  80 mg Oral Daily With Dinner David Linn, DO      polyethylene glycol  17 g Oral Daily PRN Sade Hall MD      senna-docusate sodium  1 tablet Oral Daily PRN Sade Hall MD      sertraline  50 mg Oral Daily David Linn, DO      traZODone  50 mg Oral HS PRN Sade Hall MD      traZODone  50 mg Oral HS Han Sebastian MD         Behavioral Health Medications: I have personally reviewed all current active medications. Changes as in plan section above.      ADDITIONAL DATA     EKG Results: reviewed  Results for orders placed or performed during the hospital encounter of 04/30/24 (from the past 1000 hour(s))   ECG 12 lead    Collection Time: 05/01/24 11:26 AM   Result Value    Ventricular Rate 75    Atrial Rate 75    MI Interval 120    QRSD Interval 80    QT Interval 390    QTC Interval 435    P Axis 32    QRS Axis 52    T Wave Axis 17    Narrative    Normal sinus rhythm  Normal ECG  When compared with ECG of 30-APR-2024 18:19, (unconfirmed)  No significant change was found  Confirmed by Basim Mcdermott ([id number]) on 5/1/2024 3:51:16 PM     *Note: Due to a large number of results and/or encounters for the  requested time period, some results have not been displayed. A complete set of results can be found in Results Review.       Radiology Results: Reviewed, no new imaging  No results found.  No Chest XR results available for this patient.     Laboratory Results: I have personally reviewed all pertinent laboratory/tests results  Results from the past 24 hours: No results found for this or any previous visit (from the past 24 hour(s)).      This note was not shared with the patient due to reasonable likelihood of causing patient harm        Brooke Mason MD 05/06/24  Department of Psychiatry and Behavioral Health  [facility]

## 2024-05-06 NOTE — PLAN OF CARE
Problem: Depression  Goal: Treatment Goal: Demonstrate behavioral control of depressive symptoms, verbalize feelings of improved mood/affect, and adopt new coping skills prior to discharge  Outcome: Progressing  Goal: Refrain from harming self  Description: Interventions:  - Monitor patient closely, per order   - Supervise medication ingestion, monitor effects and side effects   Outcome: Progressing  Goal: Refrain from isolation  Description: Interventions:  - Develop a trusting relationship   - Encourage socialization   Outcome: Progressing  Goal: Refrain from self-neglect  Outcome: Progressing  Goal: Attend and participate in unit activities, including therapeutic, recreational, and educational groups  Description: Interventions:  - Provide therapeutic and educational activities daily, encourage attendance and participation, and document same in the medical record   Outcome: Progressing     Problem: Anxiety  Goal: Anxiety is at manageable level  Description: Interventions:  - Assess and monitor patient's anxiety level.   - Monitor for signs and symptoms (heart palpitations, chest pain, shortness of breath, headaches, nausea, feeling jumpy, restlessness, irritable, apprehensive).   - Collaborate with interdisciplinary team and initiate plan and interventions as ordered.  - [place] patient to unit/surroundings  - Explain treatment plan  - Encourage participation in care  - Encourage verbalization of concerns/fears  - Identify coping mechanisms  - Assist in developing anxiety-reducing skills  - Administer/offer alternative therapies  - Limit or eliminate stimulants  Outcome: Progressing
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
## 2024-05-06 NOTE — CASE MANAGEMENT
Second phone call placed to [facility] at [phone] to schedule appointment with counselor for follow up. Left Voicemail.

## 2024-05-06 NOTE — NURSING NOTE
Patient is calm and cooperative on the unit. Brighter. Denies SI, HI, SIB, auditory and visual hallucinations at this time. Patient is med and meal compliant. Visible on the unit and social with peers. Attends therapy groups. Denies any unmet needs at this time. Q7 safety checks ongoing.

## 2024-05-06 NOTE — CASE MANAGEMENT
Phone call placed to [facility] at [phone] to schedule appointment with counselor for follow up. Left Voicemail.

## 2024-05-06 NOTE — CASE MANAGEMENT
Phone call to MARIA ELENA Castillo at [phone] to schedule appointment. CM was informed that patient needs to be seen by therapist next Thursday just to check in and was asked to verify with pt and give them a call back.

## 2024-05-06 NOTE — CASE MANAGEMENT
Phone call placed to pt's  Han Valle at [phone] to provide updates regarding discharge plan. CM left voicemail.     Pt informed that  ca pick her up tomorrow at 11 AM. Medications can be sent to preferred pharmacy.

## 2024-05-07 VITALS
SYSTOLIC BLOOD PRESSURE: 119 MMHG | HEART RATE: 95 BPM | TEMPERATURE: 97.7 F | OXYGEN SATURATION: 97 % | RESPIRATION RATE: 16 BRPM | DIASTOLIC BLOOD PRESSURE: 72 MMHG | BODY MASS INDEX: 38.15 KG/M2 | HEIGHT: 65 IN | WEIGHT: 229 LBS

## 2024-05-07 PROBLEM — Z00.8 MEDICAL CLEARANCE FOR PSYCHIATRIC ADMISSION: Status: RESOLVED | Noted: 2024-04-29 | Resolved: 2024-05-07

## 2024-05-07 PROCEDURE — 99239 HOSP IP/OBS DSCHRG MGMT >30: CPT | Performed by: PSYCHIATRY & NEUROLOGY

## 2024-05-07 RX ORDER — TRAZODONE HYDROCHLORIDE 50 MG/1
50 TABLET ORAL
Qty: 30 TABLET | Refills: 1 | Status: SHIPPED | OUTPATIENT
Start: 2024-05-07 | End: 2024-07-06

## 2024-05-07 RX ORDER — GABAPENTIN 300 MG/1
300 CAPSULE ORAL 3 TIMES DAILY
Qty: 90 CAPSULE | Refills: 1 | Status: SHIPPED | OUTPATIENT
Start: 2024-05-07 | End: 2024-07-06

## 2024-05-07 RX ORDER — HYDROXYZINE 50 MG/1
50 TABLET, FILM COATED ORAL 2 TIMES DAILY
Qty: 60 TABLET | Refills: 1 | Status: SHIPPED | OUTPATIENT
Start: 2024-05-07 | End: 2024-07-06

## 2024-05-07 RX ORDER — LURASIDONE HYDROCHLORIDE 80 MG/1
80 TABLET, FILM COATED ORAL
Qty: 30 TABLET | Refills: 1 | Status: SHIPPED | OUTPATIENT
Start: 2024-05-07 | End: 2024-07-06

## 2024-05-07 RX ORDER — ALBUTEROL SULFATE 90 UG/1
2 AEROSOL, METERED RESPIRATORY (INHALATION) EVERY 4 HOURS PRN
Qty: 6.7 G | Refills: 0 | Status: SHIPPED | OUTPATIENT
Start: 2024-05-07 | End: 2024-06-06

## 2024-05-07 RX ADMIN — SERTRALINE HYDROCHLORIDE 50 MG: 50 TABLET ORAL at 08:15

## 2024-05-07 RX ADMIN — CYANOCOBALAMIN TAB 1000 MCG 1000 MCG: 1000 TAB at 08:15

## 2024-05-07 RX ADMIN — GABAPENTIN 300 MG: 300 CAPSULE ORAL at 08:15

## 2024-05-07 RX ADMIN — HYDROXYZINE HYDROCHLORIDE 50 MG: 50 TABLET, FILM COATED ORAL at 08:15

## 2024-05-07 RX ADMIN — Medication 2000 UNITS: at 08:15

## 2024-05-07 NOTE — DISCHARGE INSTR - APPOINTMENTS
Alyssa, or Sarah, our Behavioral Health Nurse Navigators, will be calling you after your discharge, on the phone number that you provided.  They will be available as an additional support, if needed.   If you wish to speak with one of them, you may contact Alyssa at [phone] or Sarah at [phone].

## 2024-05-07 NOTE — NURSING NOTE
Pt calm on approach, isolative to self and room. Denies SI/HI/AVH, Compliant with medication administration and unit routine. Pt denies any unmet needs at this time.

## 2024-05-07 NOTE — PROGRESS NOTES
05/07/24 0904   Team Meeting   Meeting Type Daily Rounds   Team Members Present   Team Members Present Physician;Nurse;   Physician Team Member Valerie   Nursing Team Member [facility] Management Team Member Dustin   Patient/Family Present   Patient Present No   Patient's Family Present No     Discharge today.

## 2024-05-07 NOTE — DISCHARGE SUMMARY
Discharge Summary - Behavioral Health   Tiffany Valle 33 y.o. female MRN: [id number]  Unit/Bed#: -02 Encounter: [id number]     Admission Date: 4/30/2024         Discharge Date: 5/7/202    Attending Psychiatrist: Mouna Padilla MD    Reason for Admission/HPI:     Tiffany Valle is a 33 y.o. female with a history of Bipolar Disorder type II, anxiety, and PTSD who was admitted to the inpatient psychiatric unit on a voluntary 201 commitment basis due to depression, anxiety, and suicidal ideation with plan to cut wrists with knife or electrocute herself with a hairdryer in bathtub.     Symptoms prior to admission included worsening depression, suicidal ideation, hopelessness, poor concentration, increased appetite, difficulty sleeping, mood swings, anxiety symptoms, anxiety attacks, flashbacks, and difficulty attending to activities of daily living. Onset of symptoms was gradual starting several months ago with progressively worsening course since that time. Stressors preceding admission included job stress, chronic anxiety, and chronic mental illness. Tiffany presented to ED seeking help for worsening depression and worsening suicidal thoughts. She thought about various ways of harming self including cutting her wrists with a knife or electrocute herself with a hairdryer plugged in bathtub. She reported in ED that she did not feel safe driving and decided to come to ED. She signed a voluntary commitment and was willing to get inpatient psychiatric treatment.     On initial evaluation after admission to the inpatient psychiatric unit Tiffany still felt depressed and anxious. She was minimizing her suicidal statements prior to admission and contracted for safety in the hospital. She was willing to get her psychotropic medications adjusted to help with her symptoms.    Past Psychiatric History:     Past Inpatient Psychiatric Treatment:   No history of past inpatient psychiatric admissions.  Past Outpatient  Psychiatric Treatment:    Currently in outpatient psychiatric treatment with a psychiatrist Dr. Castillo at [facility]  Past Suicide Attempts: yes, one attempt by trying to hang self  Past Violent Behavior: no  Past Psychiatric Medication Trials: Zoloft, Lexapro, Trazodone, Neurontin, Latuda, Buspar, Atarax, and Ativan    Substance Abuse History:    Social History       Tobacco History       Smoking Status  Never      Smokeless Tobacco Use  Never              Alcohol History       Alcohol Use Status  No              Drug Use       Drug Use Status  Never              Sexual Activity       Sexually Active  Not Asked              Activities of Daily Living    Not Asked                 Additional Substance Use Detail       Questions Responses    Problems Due to Past Use of Alcohol? No    Problems Due to Past Use of Substances? No    Substance Use Assessment Denies substance use within the past 12 months    Alcohol Use Frequency Denies use in past 12 months    Cannabis frequency Never used    Comment:  Never used on 4/30/2024     Heroin Frequency Denies use in past 12 months    Cocaine frequency Never used    Comment:  Never used on 4/30/2024     Crack Cocaine Frequency Denies use in past 12 months    Methamphetamine Frequency Denies use in past 12 months    Narcotic Frequency Denies use in past 12 months    Benzodiazepine Frequency Denies use in past 12 months    Amphetamine frequency Denies use in past 12 months    Barbiturate Frequency Denies use in past 12 months    Inhalant frequency Never used    Comment:  Never used on 4/30/2024     Hallucinogen frequency Never used    Comment:  Never used on 4/30/2024     Ecstasy frequency Never used    Comment:  Never used on 4/30/2024     Other drug frequency Never used    Comment:  Never used on 4/30/2024     Opiate frequency Denies use in past 12 months    Last reviewed by Randal Mccoy RN on 4/30/2024          I have assessed this patient for  substance use within the past 12 months    Family Psychiatric History:     Psychiatric Illness:  no family history of psychiatric illness  Substance Abuse:  no family history of substance abuse  Suicide Attempts:  no family history of suicide attempts    Social History:    Education: college graduate  Learning Disabilities: none  Marital History:   Children: 1 son 3 years old  Living Arrangement: lives in home with  and son.  Occupational History: works as a   Functioning Relationships: good support system.  Legal History: none   History: None    Traumatic History:     Abuse: no history of sexual abuse, physical abuse by mother's partner, emotional abuse by mother's partner, verbal abuse by mother's partner  Other Traumatic Events:none     Past Medical History:    History of Seizures: no  History of Head injury with loss of consciousness: no    Past Medical History:   Diagnosis Date    Anxiety     Asthma     Bipolar disorder (HCC)     Endometriosis     Iron deficiency     Ovarian cyst     left    Palpitations     Panic attack     Pelvic pain     PMDD (premenstrual dysphoric disorder)     Self-injurious behavior     Suicide attempt (HCC)     Vitamin D deficiency      Past Surgical History:   Procedure Laterality Date    OVARIAN CYST REMOVAL         Medications: All current active medications have been reviewed.    Medications prior to admission:    Prior to Admission Medications   Prescriptions Last Dose Informant Patient Reported? Taking?   LORazepam (ATIVAN) 0.5 mg tablet Past Week  Yes Yes   Sig: Take half (0.25 mg) to one (0.5 mg) daily only if needed for anxiety, max dose of 0.5 mg daily.   busPIRone (BUSPAR) 10 mg tablet 4/30/2024  Yes Yes   Sig: Take 10 mg by mouth 2 (two) times a day   lurasidone (LATUDA) 80 mg tablet 4/29/2024  Yes Yes   Sig: Take 40 mg by mouth daily with dinner   traZODone (DESYREL) 50 mg tablet 4/29/2024  Yes Yes   Sig: Take 50 mg by mouth daily at  bedtime      Facility-Administered Medications: None       Allergies:    Allergies   Allergen Reactions    Bactrim [Sulfamethoxazole-Trimethoprim]        Objective     Vital signs in last 24 hours:    Temp:  [97.6 °F (36.4 °C)-97.7 °F (36.5 °C)] 97.7 °F (36.5 °C)  HR:  [77-95] 95  Resp:  [16] 16  BP: (119-134)/(68-72) 119/72    No intake or output data in the 24 hours ending 05/07/24 0810    Hospital Course:     Tiffany was admitted to the inpatient psychiatric unit and started on Behavioral Health checks every 7 minutes. During the hospitalization she was encouraged to attend individual therapy, group therapy, milieu therapy and occupational therapy.    Psychiatric medications were adjusted over the hospital stay. To address depressive symptoms, mood instability, anxiety symptoms, and insomnia, Tiffany was treated with antidepressant Zoloft, antipsychotic medication Latuda, anxiolytic medication Atarax and Neurontin, and hypnotic medication Trazodone. Medication doses were gradually titrated during the hospital course. Latuda was restarted at the dose of 80 mg in the evening . Neurontin was also restarted at the dose of 300 mg tid . Atarax was also restarted and adjusted to 50 mg bid . Trazodone was continued at 50 mg at bedtime . Zoloft was added and titrated to 50 mg daily . Prior to beginning of treatment medications risks and benefits and possible side effects including risk of parkinsonian symptoms, Tardive Dyskinesia and metabolic syndrome related to treatment with antipsychotic medications, risk of suicidality and serotonin syndrome related to treatment with antidepressants, and risk of impaired next-day mental alertness, complex sleep-related behavior and dependence related to treatment with hypnotic medications were reviewed with Tiffany. She verbalized understanding and agreement for treatment. Upon admission Tiffany was seen by medical service for medical clearance for inpatient treatment and medical  follow up.    Tiffany's symptoms slowly improved over the hospital course. Initially after admission she was still feeling depressed and anxious. With adjustment of medications and therapeutic milieu her symptoms gradually resolved. At the end of treatment Tiffany was doing much better. Her mood was significantly improved at the time of discharge. She denied suicidal ideation, intent or plan at the time of discharge and denied homicidal ideation, intent or plan at the time of discharge. There was no overt psychosis at the time of discharge. She was participating appropriately in milieu at the time of discharge. Sleep and appetite were improved. She was tolerating medications and was not reporting any significant side effects at the time of discharge.    Since Tiffany was doing well at the end of the hospitalization, treatment team felt that she could be safely discharged to outpatient care. We felt that Tiffany achieved the maximum benefit of inpatient stay at that point and could now follow up with outpatient treatment. Prior to discharge  spoke with Tiffany's  to address support and her readiness for discharge. Tiffany's  felt comfortable with her release from the hospital and was going to provide support to her after discharge. Tiffany also felt stable and ready for discharge at the end of the hospital stay.    The outpatient follow up with psychiatrist Dr. Castillo at [facility] and a therapist at [facility]  was arranged by the unit  upon discharge.      Mental Status at Time of Discharge:     Appearance:  age appropriate, casually dressed   Behavior:  pleasant, cooperative, calm   Speech:  normal rate, normal volume, normal pitch   Mood:  euthymic   Affect:  normal range and intensity, appropriate   Thought Process:  organized, goal directed   Associations: intact associations   Thought Content:  no overt delusions   Perceptual  Disturbances: no auditory hallucinations, no visual hallucinations   Risk Potential: Suicidal ideation - None  Homicidal ideation - None  Potential for aggression - No   Sensorium:  oriented to person, place, time/date, and situation   Memory:  recent and remote memory grossly intact   Consciousness:  alert and awake   Attention/Concentration: attention span and concentration are age appropriate   Insight:  fair   Judgment: fair   Gait/Station: normal gait/station, normal balance   Motor Activity: no abnormal movements       Suicide/Homicide Risk Assessment:    Risk of Harm to Self:   Demographic risk factors include:   Historical Risk Factors include: history of anxiety, history of mood disorder, history of suicide attempt, history of traumatic experiences  Current Specific Risk Factors include: recent inpatient psychiatric admission - being discharged today, diagnosis of mood disorder  Protective Factors: no current suicidal ideation, stable mood, improved anxiety symptoms, family support established, being a parent, being , compliant with medications, compliant with mental health treatment, stable housing, connection to own children  Weapons/Firearms: none. The following steps have been taken to ensure weapons are properly secured: not applicable  Based on today's assessment, Tiffany presents the following risk of harm to self: low    Risk of Harm to Others:  Demographic Risk Factors include: under age 40.  Historical Risk Factors include: none.  Current Specific Risk Factors include: recent episode of mood instability  Protective Factors: no current homicidal ideation, improved mood, compliant with medications, willing to continue psychiatric treatment, stable living environment, good support system, being a parent, being   Based on today's assessment, Tiffany presents the following risk of harm to others: none    The following interventions are recommended: outpatient follow up with a  psychiatrist, follow up with family physician for medical issues    Admission Diagnosis:    Principal Problem:    Bipolar 2 disorder, major depressive episode (HCC)  Active Problems:    Panic disorder without agoraphobia    SOCO (generalized anxiety disorder)    Post-traumatic stress disorder, chronic    Medical clearance for psychiatric admission    Obesity (BMI 35.0-39.9 without comorbidity)    PMDD (premenstrual dysphoric disorder)    Discharge Diagnosis:     Principal Problem:    Bipolar 2 disorder, major depressive episode (HCC)  Active Problems:    Panic disorder without agoraphobia    SOCO (generalized anxiety disorder)    Post-traumatic stress disorder, chronic    Obesity (BMI 35.0-39.9 without comorbidity)    PMDD (premenstrual dysphoric disorder)  Resolved Problems:    Medical clearance for psychiatric admission      Laboratory Results: I have personally reviewed all pertinent laboratory/tests results    Most Recent Labs:   Lab Results   Component Value Date    WBC 7.74 04/30/2024    RBC 4.95 04/30/2024    HGB 13.8 04/30/2024    HCT 43.6 04/30/2024     04/30/2024    RDW 12.9 04/30/2024    NEUTROABS 3.74 04/30/2024    SODIUM 138 04/30/2024    K 3.7 04/30/2024     04/30/2024    CO2 23 04/30/2024    BUN 16 04/30/2024    CREATININE 1.10 04/30/2024    GLUC 121 04/30/2024    CALCIUM 9.2 04/30/2024    AST 14 04/30/2024    ALT 12 04/30/2024    ALKPHOS 70 04/30/2024    TP 7.6 04/30/2024    ALB 4.7 04/30/2024    TBILI 0.93 04/30/2024    CHOLESTEROL 178 04/30/2024    HDL 43 (L) 04/30/2024    TRIG 152 (H) 04/30/2024    LDLCALC 105 (H) 04/30/2024    NONHDLC 135 04/30/2024    MEL6PYKWZUOG 1.177 04/30/2024    PREGSERUM Negative 04/30/2024    HGBA1C 5.3 04/30/2024     04/30/2024   Drug Screen:   Lab Results   Component Value Date    AMPMETHUR Negative 04/29/2024    BARBTUR Negative 04/29/2024    BDZUR Negative 04/29/2024    THCUR Negative 04/29/2024    COCAINEUR Negative 04/29/2024    METHADONEUR  Negative 04/29/2024    OPIATEUR Negative 04/29/2024    PCPUR Negative 04/29/2024   ECG with report:   Results for orders placed or performed during the hospital encounter of 04/30/24 (from the past 1000 hour(s))   ECG 12 lead    Collection Time: 05/01/24 11:26 AM   Result Value    Ventricular Rate 75    Atrial Rate 75    DE Interval 120    QRSD Interval 80    QT Interval 390    QTC Interval 435    P Axis 32    QRS Axis 52    T Wave Axis 17    Narrative    Normal sinus rhythm  Normal ECG  When compared with ECG of 30-APR-2024 18:19, (unconfirmed)  No significant change was found  Confirmed by Basim Mcdermott ([id number]) on 5/1/2024 3:51:16 PM     *Note: Due to a large number of results and/or encounters for the requested time period, some results have not been displayed. A complete set of results can be found in Results Review.       Discharge Medications:    See after visit summary for all reconciled discharge medications provided to patient and family.      Discharge instructions/Information to patient and family:     See after visit summary for information provided to patient and family.      Provisions for Follow-Up Care:    See after visit summary for information related to follow-up care and any pertinent home health orders.      Discharge Statement:    I spent 42 minutes discharging the patient. This time was spent on the day of discharge. I had direct contact with the patient on the day of discharge.     Additional documentation is required if more than 30 minutes were spent on discharge:    I reviewed with Tiffany importance of compliance with medications and outpatient treatment after discharge.  I discussed the medication regimen and possible side effects of the medications with Tiffany prior to discharge. At the time of discharge she was tolerating psychiatric medications.  I discussed outpatient follow up with Tiffany.  I reviewed with Tiffany crisis plan and safety plan upon discharge.  Tiffany was competent  to understand risks and benefits of withholding information and risks and benefits of her actions.    Discharge on Two Antipsychotic Medications: No    Mouna Padilla MD 05/07/24

## 2024-05-07 NOTE — PLAN OF CARE
Problem: Ineffective Coping  Goal: Cooperates with admission process  Description: Interventions:   - Complete admission process  Outcome: Completed  Goal: Identifies ineffective coping skills  Outcome: Completed  Goal: Identifies healthy coping skills  Outcome: Completed  Goal: Demonstrates healthy coping skills  Outcome: Completed  Goal: Participates in unit activities  Description: Interventions:  - Provide therapeutic environment   - Provide required programming   - Redirect inappropriate behaviors   Outcome: Completed  Goal: Patient/Family verbalizes awareness of resources  Outcome: Completed  Goal: Understands least restrictive measures  Description: Interventions:  - Utilize least restrictive behavior  Outcome: Completed  Goal: Free from restraint events  Description: - Utilize least restrictive measures   - Provide behavioral interventions   - Redirect inappropriate behaviors   Outcome: Completed     Problem: Depression  Goal: Treatment Goal: Demonstrate behavioral control of depressive symptoms, verbalize feelings of improved mood/affect, and adopt new coping skills prior to discharge  Outcome: Completed  Goal: Refrain from harming self  Description: Interventions:  - Monitor patient closely, per order   - Supervise medication ingestion, monitor effects and side effects   Outcome: Completed  Goal: Refrain from isolation  Description: Interventions:  - Develop a trusting relationship   - Encourage socialization   Outcome: Completed  Goal: Refrain from self-neglect  Outcome: Completed  Goal: Attend and participate in unit activities, including therapeutic, recreational, and educational groups  Description: Interventions:  - Provide therapeutic and educational activities daily, encourage attendance and participation, and document same in the medical record   Outcome: Completed     Problem: Anxiety  Goal: Anxiety is at manageable level  Description: Interventions:  - Assess and monitor patient's anxiety  level.   - Monitor for signs and symptoms (heart palpitations, chest pain, shortness of breath, headaches, nausea, feeling jumpy, restlessness, irritable, apprehensive).   - Collaborate with interdisciplinary team and initiate plan and interventions as ordered.  - [place] patient to unit/surroundings  - Explain treatment plan  - Encourage participation in care  - Encourage verbalization of concerns/fears  - Identify coping mechanisms  - Assist in developing anxiety-reducing skills  - Administer/offer alternative therapies  - Limit or eliminate stimulants  Outcome: Completed     Problem: DISCHARGE PLANNING  Goal: Discharge to home or other facility with appropriate resources  Description: INTERVENTIONS:  - Identify barriers to discharge w/patient and caregiver  - Arrange for needed discharge resources and transportation as appropriate  - Identify discharge learning needs (meds, wound care, etc.)  - Arrange for interpretive services to assist at discharge as needed  - Refer to Case Management Department for coordinating discharge planning if the patient needs post-hospital services based on physician/advanced practitioner order or complex needs related to functional status, cognitive ability, or social support system  Outcome: Completed

## 2024-05-07 NOTE — PLAN OF CARE
Problem: Ineffective Coping  Goal: Cooperates with admission process  Description: Interventions:   - Complete admission process  Outcome: Progressing  Goal: Identifies ineffective coping skills  Outcome: Progressing  Goal: Identifies healthy coping skills  Outcome: Progressing  Goal: Demonstrates healthy coping skills  Outcome: Progressing  Goal: Participates in unit activities  Description: Interventions:  - Provide therapeutic environment   - Provide required programming   - Redirect inappropriate behaviors   Outcome: Progressing  Goal: Patient/Family verbalizes awareness of resources  Outcome: Progressing  Goal: Understands least restrictive measures  Description: Interventions:  - Utilize least restrictive behavior  Outcome: Progressing  Goal: Free from restraint events  Description: - Utilize least restrictive measures   - Provide behavioral interventions   - Redirect inappropriate behaviors   Outcome: Progressing     Problem: Depression  Goal: Treatment Goal: Demonstrate behavioral control of depressive symptoms, verbalize feelings of improved mood/affect, and adopt new coping skills prior to discharge  Outcome: Progressing  Goal: Refrain from harming self  Description: Interventions:  - Monitor patient closely, per order   - Supervise medication ingestion, monitor effects and side effects   Outcome: Progressing  Goal: Refrain from isolation  Description: Interventions:  - Develop a trusting relationship   - Encourage socialization   Outcome: Progressing  Goal: Refrain from self-neglect  Outcome: Progressing  Goal: Attend and participate in unit activities, including therapeutic, recreational, and educational groups  Description: Interventions:  - Provide therapeutic and educational activities daily, encourage attendance and participation, and document same in the medical record   Outcome: Progressing     Problem: Anxiety  Goal: Anxiety is at manageable level  Description: Interventions:  - Assess and  monitor patient's anxiety level.   - Monitor for signs and symptoms (heart palpitations, chest pain, shortness of breath, headaches, nausea, feeling jumpy, restlessness, irritable, apprehensive).   - Collaborate with interdisciplinary team and initiate plan and interventions as ordered.  - [place] patient to unit/surroundings  - Explain treatment plan  - Encourage participation in care  - Encourage verbalization of concerns/fears  - Identify coping mechanisms  - Assist in developing anxiety-reducing skills  - Administer/offer alternative therapies  - Limit or eliminate stimulants  Outcome: Progressing

## 2024-05-07 NOTE — DISCHARGE INSTR - OTHER ORDERS
CRISIS INFORMATION  Mental Health Matters!  Help is available. Please call.  Mental Health Crisis Intervention and Suicide Prevention Hot Line: Dial 988  Local Crisis number: 161-591-0992 or toll free: 1-331.979.7921 - 24-Hour Crisis & Emergency Services  Telephone Crisis Intervention is available 24 hours a day 7 days a week.  Mobile Crisis Intervention is available to be dispatched to the three counties during certain times and can be requested at the same numbers.  There is also a crisis residence available for those who need that level of care.  National Crisis Text Line:  246588    Drug and Alcohol Services  For information on how to access Drug and Alcohol treatment services please contact:  After hours 24/7 number:  [facility] at [phone]  During regular business hours call:  [facility] Office  [address] [place] [address]  Phone: [phone]    [facility] ([facility])  [address] [place] [address]  Phone: [phone] [place] [address] [address]  [place] [address]  New Admissions - [phone]  Local Office - [phone]   [facility] Office  [address]  [place] [address]  Phone [phone]      From the [facility] website [place].gov[place]guides/opioid-epidemic/#GetNaloxone    How do I get naloxone?  Family members and friends can access naloxone by:    Obtaining a prescription from their family doctor  Using the standing order issued by [facility] Seema Alonzo. A standing order is a prescription written for the general public, rather than specifically for an individual.  To use the standing order, print it and take it with you to the pharmacy or have the digital version on your phone. Download the standing order from the Department of Health (PDF).    If you are unable to  print it or use the digital version, the standing order is kept on file at many pharmacies. If a pharmacy does not have it on file, they may have the ability to look it up.    Naloxone prescriptions can be filled at most pharmacies. Although the medication might not be available for same-day pickup, it often can be ordered and available within a day or two.

## 2024-05-07 NOTE — NURSING NOTE
Pt denies SI, HI and A/V hallucinations. Compliant with meds and meals. Observed with calm, pleasant and bright affect on approach. Pt verbalized feeling happy to leave today and reminded RN of this. Is behaviorally appropriate on unit, cooperative with unit rules and denies complaints. No evidence of adverse side effects from meds. Is visible in bedroom and common areas at select times. Current treatment plan effective.

## 2024-05-07 NOTE — CASE MANAGEMENT
Pt to D/C today. Pt denies SI/HI/AVH. Pt oriented x3. Pt to d/c to pt's home and  will  upon discharge. Pt to follow up with [facility] Dr. Castillo on 05/16. Pt to follow up with her counselor and will schedule appointment. Scripts sent to preferred pharmacy.     Discharge Address: [address] [place] [address]   Phone:   [phone]

## 2024-05-07 NOTE — NURSING NOTE
Pt escorted out of 3[facility] to care of  en route to home. Pt was given all personal belongings, discharge instructions and acknowledges understanding of all expectations and education after discharge as well as followup recommendations.

## 2024-05-07 NOTE — CASE MANAGEMENT
Phone call to MARIA ELENA Castillo at [phone] to schedule appointment. Appointment was schedule for quick check in with psychiatrist for 05/16 at 1:30PM virtual. Pt will receive link information via email. Pt will need to schedule follow up appointment.

## 2024-05-08 LAB
ALBUMIN SERPL BCP-MCNC: 4.7 G/DL (ref 3.5–5)
ALP SERPL-CCNC: 70 U/L (ref 34–104)
ALT SERPL W P-5'-P-CCNC: 12 U/L (ref 7–52)
ANION GAP SERPL CALCULATED.3IONS-SCNC: 12 MMOL/L (ref 4–13)
AST SERPL W P-5'-P-CCNC: 14 U/L (ref 13–39)
BILIRUB SERPL-MCNC: 0.93 MG/DL (ref 0.2–1)
BUN SERPL-MCNC: 16 MG/DL (ref 5–25)
CALCIUM SERPL-MCNC: 9.2 MG/DL (ref 8.4–10.2)
CHLORIDE SERPL-SCNC: 103 MMOL/L (ref 96–108)
CO2 SERPL-SCNC: 23 MMOL/L (ref 21–32)
CREAT SERPL-MCNC: 1.1 MG/DL (ref 0.6–1.3)
GFR SERPL CREATININE-BSD FRML MDRD: 65 ML/MIN/1.73SQ M
GLUCOSE SERPL-MCNC: 121 MG/DL (ref 65–140)
POTASSIUM SERPL-SCNC: 3.7 MMOL/L (ref 3.5–5.3)
PROT SERPL-MCNC: 7.6 G/DL (ref 6.4–8.4)
SODIUM SERPL-SCNC: 138 MMOL/L (ref 135–147)

## 2024-05-21 ENCOUNTER — TELEPHONE (OUTPATIENT)
Dept: PSYCHOLOGY | Facility: CLINIC | Age: 34
End: 2024-05-21

## 2024-05-21 NOTE — TELEPHONE ENCOUNTER
Called pt regarding PHP referral received from [facility] and lvm asking to return my call to schedule PHP.

## 2024-05-29 ENCOUNTER — APPOINTMENT (OUTPATIENT)
Dept: URGENT CARE | Facility: CLINIC | Age: 34
End: 2024-05-29